# Patient Record
Sex: FEMALE | Race: BLACK OR AFRICAN AMERICAN | Employment: OTHER | URBAN - METROPOLITAN AREA
[De-identification: names, ages, dates, MRNs, and addresses within clinical notes are randomized per-mention and may not be internally consistent; named-entity substitution may affect disease eponyms.]

---

## 2018-01-03 DIAGNOSIS — O09.90 HIGH-RISK PREGNANCY, UNSPECIFIED TRIMESTER: Primary | ICD-10-CM

## 2018-01-08 ENCOUNTER — HOSPITAL ENCOUNTER (OUTPATIENT)
Dept: ULTRASOUND IMAGING | Facility: CLINIC | Age: 38
Discharge: HOME | End: 2018-01-08
Attending: MIDWIFE | Admitting: MIDWIFE
Payer: MEDICAID

## 2018-01-08 DIAGNOSIS — O09.90 HIGH-RISK PREGNANCY, UNSPECIFIED TRIMESTER: ICD-10-CM

## 2018-01-08 PROCEDURE — 76801 OB US < 14 WKS SINGLE FETUS: CPT

## 2018-01-08 PROCEDURE — T1013 SIGN LANG/ORAL INTERPRETER: HCPCS | Mod: U3

## 2018-02-12 DIAGNOSIS — Z36.89 ENCOUNTER FOR FETAL ANATOMIC SURVEY: ICD-10-CM

## 2018-02-12 DIAGNOSIS — Z13.71 SCREENING FOR GENETIC DISEASE CARRIER STATUS: Primary | ICD-10-CM

## 2018-03-15 ENCOUNTER — PRE VISIT (OUTPATIENT)
Dept: MATERNAL FETAL MEDICINE | Facility: CLINIC | Age: 38
End: 2018-03-15

## 2018-03-30 ENCOUNTER — HOSPITAL ENCOUNTER (OUTPATIENT)
Dept: ULTRASOUND IMAGING | Facility: CLINIC | Age: 38
Discharge: HOME | End: 2018-03-30
Attending: ADVANCED PRACTICE MIDWIFE | Admitting: OBSTETRICS & GYNECOLOGY
Payer: MEDICAID

## 2018-03-30 ENCOUNTER — OFFICE VISIT (OUTPATIENT)
Dept: MATERNAL FETAL MEDICINE | Facility: CLINIC | Age: 38
End: 2018-03-30
Attending: ADVANCED PRACTICE MIDWIFE
Payer: MEDICAID

## 2018-03-30 DIAGNOSIS — O09.522 SUPERVISION OF ELDERLY MULTIGRAVIDA IN SECOND TRIMESTER: Primary | ICD-10-CM

## 2018-03-30 DIAGNOSIS — O26.90 PREGNANCY RELATED CONDITION, ANTEPARTUM: ICD-10-CM

## 2018-03-30 DIAGNOSIS — O09.522 ELDERLY MULTIGRAVIDA IN SECOND TRIMESTER: Primary | ICD-10-CM

## 2018-03-30 PROCEDURE — 76811 OB US DETAILED SNGL FETUS: CPT

## 2018-03-30 PROCEDURE — 96040 ZZH GENETIC COUNSELING, EACH 30 MINUTES: CPT | Mod: ZF | Performed by: GENETIC COUNSELOR, MS

## 2018-03-30 NOTE — PROGRESS NOTES
[facility]  Genetic Counseling Consult    Patient:  Lucia Lora YOB: 1980   Date of Service:  3/30/18      Lucia Lora was seen at the [facility] for genetic consultation as part of her appointment for comprehensive ultrasound.  The indication for genetic counseling is advanced maternal age.       Impression/Plan:   1. Lucia has not had serum screening in this pregnancy.     2. Lucia had a comprehensive (level II) ultrasound today.  Please see the ultrasound report for further details.    3. The patient declines genetic amniocentesis and maternal serum screening today.    Pregnancy History:   /Parity:    Age at Delivery: 38 year old  ANDREINA: 2018, by Last Menstrual Period  Gestational Age: 19w2d    No significant complications or exposures were reported in the current pregnancy.    Lucia s pregnancy history is significant for three prior full term pregnancies with no reported complications.    Medical History:   Lucia s reported medical history is not expected to impact pregnancy management or risks to fetal development.       Family History:   A three-generation pedigree was obtained, and is scanned under the  Media  tab.   The following significant findings were reported by Lucia:      No significant health findings reported in any 1st, 2nd, or 3rd degree relative to the pregnancy    The reported family history is negative for multiple miscarriages, stillbirths, birth defects, mental retardation, known genetic conditions, and consanguinity.       Carrier Screening:       The patient has had previous carrier screening for hemoglobinopathies, the results of which were negative.  A copy of the report was available for our review today.       Risk Assessment for Chromosome Conditions:   We explained that the risk for fetal chromosome abnormalities increases with maternal age. We discussed  specific features of common chromosome abnormalities, including Down syndrome, trisomy 13, trisomy 18, and sex chromosome trisomies.      - At age 38 at midtrimester, the risk to have a baby with Down syndrome is 1 in 216.     - At age 38 at midtrimester, the risk to have a baby with any chromosome abnormality is 1 in 105.     - At age 38 at delivery, the risk to have a baby with Down syndrome is 1 in 294.     - At age 38 at delivery, the risk to have a baby with any chromosome abnormality is 1 in 163.       Lucia did not have maternal serum screening earlier in pregnancy.         Testing Options:   We discussed the following options:   Non-invasive Prenatal Testing (NIPT)    Maternal plasma cell-free DNA testing; first trimester ultrasound with nuchal translucency and nasal bone assessment is recommended, when appropriate    Screens for fetal trisomy 21, trisomy 13, trisomy 18, and sex chromosome aneuploidy    Cannot screen for open neural tube defects; maternal serum AFP after 15 weeks is recommended       Genetic Amniocentesis    Invasive procedure typically performed in the second trimester by which amniotic fluid is obtained for the purpose of chromosome analysis and/or other prenatal genetic analysis    Diagnostic results; >99% sensitivity for fetal chromosome abnormalities    AFAFP measurement tests for open neural tube defects       Comprehensive (Level II) ultrasound:    Detailed ultrasound performed between 18-22 weeks gestation    Screen for major birth defects and markers for aneuploidy    We reviewed the benefits and limitations of this testing.  Screening tests provide a risk assessment specific to the pregnancy for certain fetal chromosome abnormalities, but cannot definitively diagnose or exclude a fetal chromosome abnormality.  Follow-up genetic counseling and consideration of diagnostic testing is recommended with any abnormal screening result. Diagnostic tests carry inherent risks- including risk  of miscarriage- that require careful consideration.  These tests can detect fetal chromosome abnormalities with greater than 99% certainty.  Results can be compromised by maternal cell contamination or mosaicism, and are limited by the resolution of cytogenetic G-banding technology.  There is no screening nor diagnostic test that can detect all forms of birth defects or mental disability.    It was a pleasure to be involved with Lucia s care. Face-to-face time of the meeting was 25 minutes.      Harrison Hussein MS, [facility]  Licensed Genetic Counselor  Phone: [phone]  Pager: [phone]

## 2018-03-30 NOTE — MR AVS SNAPSHOT
"              After Visit Summary   3/30/2018    Lucia Lora    MRN: [id number]           Patient Information     Date Of Birth          1980        Visit Information        Provider Department      3/30/2018 8:45 AM Harrison Hussein GC [facility] Maternal Fetal Medicine [facility]        Today's Diagnoses     Supervision of elderly multigravida in second trimester    -  1    Pregnancy related condition, antepartum           Follow-ups after your visit        Who to contact     If you have questions or need follow up information about today's clinic visit or your schedule please contact [facility] MATERNAL FETAL MEDICINE [facility] directly at [phone].  Normal or non-critical lab and imaging results will be communicated to you by UXArmyhart, letter or phone within 4 business days after the clinic has received the results. If you do not hear from us within 7 days, please contact the clinic through UXArmyhart or phone. If you have a critical or abnormal lab result, we will notify you by phone as soon as possible.  Submit refill requests through BestBoy Keyboard or call your pharmacy and they will forward the refill request to us. Please allow 3 business days for your refill to be completed.          Additional Information About Your Visit        MyChart Information     BestBoy Keyboard lets you send messages to your doctor, view your test results, renew your prescriptions, schedule appointments and more. To sign up, go to [email or web address] . Click on \"Log in\" on the left side of the screen, which will take you to the Welcome page. Then click on \"Sign up Now\" on the right side of the page.     You will be asked to enter the access code listed below, as well as some personal information. Please follow the directions to create your username and password.     Your access code is: [email or web address]  Expires: 2018  2:31 PM     Your access code will  in 90 days. If you need help or a new code, please call your [place] " clinic or [phone].        Care EveryWhere ID     This is your Care EveryWhere ID. This could be used by other organizations to access your [place] medical records  [id number]        Your Vitals Were     Last Period                   11/15/2017            Blood Pressure from Last 3 Encounters:   No data found for BP    Weight from Last 3 Encounters:   No data found for Wt              We Performed the Following     [facility] Genetic Counseling        Primary Care Provider Office Phone # Fax #    Clinic [facility] [phone]       [address] [facility] [address]        Equal Access to Services     [facility] : Hadii aad ku hadasho Soomaali, waaxda luqadaha, qaybta kaalmada adeegyada, waxay nataliyain hayaan adeeg steffi fisher . So [facility] [phone].    ATENCIÓN: Si habla español, tiene a hu disposición servicios gratuitos de asistencia lingüística. [facility] [phone].    We comply with applicable federal civil rights laws and [place] laws. We do not discriminate on the basis of race, color, national origin, age, disability, sex, sexual orientation, or gender identity.            Thank you!     Thank you for choosing [facility]  for your care. Our goal is always to provide you with excellent care. Hearing back from our patients is one way we can continue to improve our services. Please take a few minutes to complete the written survey that you may receive in the mail after your visit with us. Thank you!             Your Updated Medication List - Protect others around you: Learn how to safely use, store and throw away your medicines at www.disposemymeds.org.      Notice  As of 3/30/2018  2:31 PM    You have not been prescribed any medications.

## 2018-03-30 NOTE — PROGRESS NOTES
"Please see \"Imaging\" tab under \"Chart Review\" for details of today's US.      Jo Ann Renae, DO  Maternal-Fetal Medicine        "

## 2018-03-30 NOTE — MR AVS SNAPSHOT
"              After Visit Summary   3/30/2018    Lucia Lora    MRN: [id number]           Patient Information     Date Of Birth          1980        Visit Information        Provider Department      3/30/2018 10:00 AM Jo Ann Renae, DO [facility] Maternal Fetal Medicine [facility]        Today's Diagnoses     Elderly multigravida in second trimester    -  1       Follow-ups after your visit        Who to contact     If you have questions or need follow up information about today's clinic visit or your schedule please contact [facility] MATERNAL FETAL MEDICINE [facility] directly at [phone].  Normal or non-critical lab and imaging results will be communicated to you by QuietStream Financialhart, letter or phone within 4 business days after the clinic has received the results. If you do not hear from us within 7 days, please contact the clinic through [email or web address] or phone. If you have a critical or abnormal lab result, we will notify you by phone as soon as possible.  Submit refill requests through Celery or call your pharmacy and they will forward the refill request to us. Please allow 3 business days for your refill to be completed.          Additional Information About Your Visit        MyChart Information     Celery lets you send messages to your doctor, view your test results, renew your prescriptions, schedule appointments and more. To sign up, go to [email or web address] . Click on \"Log in\" on the left side of the screen, which will take you to the Welcome page. Then click on \"Sign up Now\" on the right side of the page.     You will be asked to enter the access code listed below, as well as some personal information. Please follow the directions to create your username and password.     Your access code is: [email or web address]  Expires: 2018  2:31 PM     Your access code will  in 90 days. If you need help or a new code, please call your [place] clinic or [phone].        Care EveryWhere ID     This is your " Care EveryWhere ID. This could be used by other organizations to access your [place] medical records  [id number]        Your Vitals Were     Last Period                   11/15/2017            Blood Pressure from Last 3 Encounters:   No data found for BP    Weight from Last 3 Encounters:   No data found for Wt              Today, you had the following     No orders found for display       Primary Care Provider Office Phone # Fax #    Clinic [facility] [phone]       [address] [facility] [address]        Equal Access to Services     CARLENE BENÍTEZ : Hadii aad ku hadasho Soomaali, waaxda luqadaha, qaybta kaalmada adeegyada, waxay idiin hayaan adeeg kharash la'neli . So [facility] [phone].    ATENCIÓN: Si habla español, tiene a hu disposición servicios gratuitos de asistencia lingüística. [facility] [phone].    We comply with applicable federal civil rights laws and [place] laws. We do not discriminate on the basis of race, color, national origin, age, disability, sex, sexual orientation, or gender identity.            Thank you!     Thank you for choosing [facility]  for your care. Our goal is always to provide you with excellent care. Hearing back from our patients is one way we can continue to improve our services. Please take a few minutes to complete the written survey that you may receive in the mail after your visit with us. Thank you!             Your Updated Medication List - Protect others around you: Learn how to safely use, store and throw away your medicines at www.disposemymeds.org.      Notice  As of 3/30/2018  3:17 PM    You have not been prescribed any medications.

## 2018-05-23 DIAGNOSIS — O24.410 DIET CONTROLLED GESTATIONAL DIABETES MELLITUS (GDM) IN THIRD TRIMESTER: Primary | ICD-10-CM

## 2018-06-20 DIAGNOSIS — O24.419 GESTATIONAL DIABETES MELLITUS (GDM) IN THIRD TRIMESTER, GESTATIONAL DIABETES METHOD OF CONTROL UNSPECIFIED: ICD-10-CM

## 2018-06-20 DIAGNOSIS — O09.93 HIGH-RISK PREGNANCY IN THIRD TRIMESTER: Primary | ICD-10-CM

## 2018-06-28 DIAGNOSIS — O24.415 GESTATIONAL DIABETES MELLITUS (GDM) IN THIRD TRIMESTER CONTROLLED ON ORAL HYPOGLYCEMIC DRUG: Primary | ICD-10-CM

## 2018-07-02 ENCOUNTER — OFFICE VISIT (OUTPATIENT)
Dept: MATERNAL FETAL MEDICINE | Facility: CLINIC | Age: 38
End: 2018-07-02
Attending: ADVANCED PRACTICE MIDWIFE
Payer: COMMERCIAL

## 2018-07-02 ENCOUNTER — HOSPITAL ENCOUNTER (OUTPATIENT)
Dept: ULTRASOUND IMAGING | Facility: CLINIC | Age: 38
Discharge: HOME | End: 2018-07-02
Attending: ADVANCED PRACTICE MIDWIFE | Admitting: SOCIAL WORKER
Payer: COMMERCIAL

## 2018-07-02 ENCOUNTER — OFFICE VISIT (OUTPATIENT)
Dept: ENDOCRINOLOGY | Facility: CLINIC | Age: 38
End: 2018-07-02
Payer: COMMERCIAL

## 2018-07-02 VITALS
DIASTOLIC BLOOD PRESSURE: 74 MMHG | WEIGHT: 264.4 LBS | HEIGHT: 69 IN | SYSTOLIC BLOOD PRESSURE: 110 MMHG | HEART RATE: 109 BPM | BODY MASS INDEX: 39.16 KG/M2

## 2018-07-02 DIAGNOSIS — O26.90 PREGNANCY RELATED CONDITION, UNSPECIFIED TRIMESTER: ICD-10-CM

## 2018-07-02 DIAGNOSIS — E01.0 THYROMEGALY: ICD-10-CM

## 2018-07-02 DIAGNOSIS — O24.414 INSULIN CONTROLLED GESTATIONAL DIABETES MELLITUS (GDM) DURING PREGNANCY, ANTEPARTUM: Primary | ICD-10-CM

## 2018-07-02 PROBLEM — O24.419 GESTATIONAL DIABETES: Status: ACTIVE | Noted: 2018-07-02

## 2018-07-02 LAB — HBA1C MFR BLD: 6.1 % (ref 4.3–6)

## 2018-07-02 PROCEDURE — 76819 FETAL BIOPHYS PROFIL W/O NST: CPT

## 2018-07-02 NOTE — MR AVS SNAPSHOT
After Visit Summary   7/2/2018    Lucia Lora    MRN: [id number]           Patient Information     Date Of Birth          1980        Visit Information        Provider Department      7/2/2018 11:45 AM Sharri Langford MD; MONICA GAYTAN [facility] Endocrinology        Today's Diagnoses     Gestational diabetes    -  1    Thyromegaly          Care Instructions    Target BG during pregnancy:   Fasting <95   1 hr postmeal <140   2 hrs postmeal <120   HbA1c < 6%       Start checking the BG before breakfast, 1 hr after the meal meals of the day and at bedtime   Contact us with the BG numbers after starting insulin for 5 days or have the meter downloaded in the clinic   Correct the hypoglycemic episodes (BG<70) by having 3-4 glucose tablets or 4-8 oz of sweetened juice of preference.             Follow-ups after your visit        Follow-up notes from your care team     Return in about 4 weeks (around 7/30/2018) for labs in 1 week.      Your next 10 appointments already scheduled     Jul 02, 2018  3:00 PM CDT   MFDAVID BPP SINGLE with [id number]   [facility] - [facility])    [address]  [facility] [address] [phone]            Jul 02, 2018  3:30 PM CDT   Radiology MD with UR IMAN MONTILLA   [facility] - [facility])    [address]  [facility] [address]   [phone]           Please arrive at the time given for your first appointment. This visit is used internally to schedule the physician's time during your ultrasound.            Jul 05, 2018 11:00 AM CDT   [facility] US COMPRE SINGLE F/U with [id number]   [facility] Ultrasound - [facility])    [address]  [facility] [id number] [phone]           Wear comfortable clothes and leave  your valuables at home.            Jul 05, 2018 11:30 AM CDT   Radiology MD with JULIANA VALERIO MD   [facility] [place] ([facility])    [address]  [facility] [address]   [phone]           Please arrive at the time given for your first appointment. This visit is used internally to schedule the physician's time during your ultrasound.            Jul 05, 2018 12:15 PM CDT   Diabetic Education with Nu Corona RD   [facility] [place],  Diabetes Education ([facility])    [facility] [address]  [address] [place] [id number] [phone]            Jul 30, 2018 11:00 AM CDT   (Arrive by 10:45 AM)   RETURN DIABETES with Sharri Langford MD   [facility] Endocrinology ([facility])    [address] [facility]  3rd Floor  [facility] [place] [id number] [phone]              Future tests that were ordered for you today     Open Future Orders        Priority Expected Expires Ordered    TSH with free T4 reflex Routine 7/2/2018 7/2/2019 7/2/2018            Who to contact     Please call your clinic at [phone] to:    Ask questions about your health    Make or cancel appointments    Discuss your medicines    Learn about your test results    Speak to your doctor            Additional Information About Your Visit        MyChart Information     Flared3D is an electronic gateway that provides easy, online access to your medical records. With Flared3D, you can request a clinic appointment, read your test results, renew a prescription or communicate with your care team.     To sign up for Flared3D visit the website at [email or web address]   You will be asked to enter the access code listed below, as well as some personal information. Please follow the directions to create your username and password.     Your access code is: [email or web address]  Expires: 9/27/2018  6:31 AM     Your access  "code will  in 90 days. If you need help or a new code, please contact your [facility] or call [phone] for assistance.        Care EveryWhere ID     This is your Care EveryWhere ID. This could be used by other organizations to access your [place] medical records  [id number]        Your Vitals Were     Pulse Height Last Period BMI (Body Mass Index)          109 1.753 m (5' 9\") 11/15/2017 39.05 kg/m2         Blood Pressure from Last 3 Encounters:   18 110/74    Weight from Last 3 Encounters:   18 119.9 kg (264 lb 6.4 oz)              We Performed the Following     Hemoglobin A1c POCT          Today's Medication Changes          These changes are accurate as of 18 12:54 PM.  If you have any questions, ask your nurse or doctor.               Start taking these medicines.        Dose/Directions    insulin  UNIT/ML injection   Commonly known as:  HumuLIN N/NovoLIN N   Used for:  Gestational diabetes   Started by:  Sharri Langford MD        Dose:  7 Units   Inject 7 Units Subcutaneous At Bedtime   Quantity:  9 mL   Refills:  3            Where to get your medicines      These medications were sent to [facility] [address] [place] [address]     Phone:  [phone]     insulin  UNIT/ML injection                Primary Care Provider Office Phone # Fax #    [facility] [phone]        [facility] [address]        Equal Access to Services     CARLENE BENÍTEZ AH: Hadii jayde petersono Sobenitoali, waaxda luqadaha, qaybta kaalmada adeegyada, waxay maren to ademegan wilks. So [facility] [phone].    ATENCIÓN: Si habla español, tiene a hu disposición servicios gratuitos de asistencia lingüística. Llame al [phone].    We comply with applicable federal civil rights laws and [place] laws. We do not discriminate on the basis of race, color, " national origin, age, disability, sex, sexual orientation, or gender identity.            Thank you!     Thank you for choosing [facility] ENDOCRINOLOGY  for your care. Our goal is always to provide you with excellent care. Hearing back from our patients is one way we can continue to improve our services. Please take a few minutes to complete the written survey that you may receive in the mail after your visit with us. Thank you!             Your Updated Medication List - Protect others around you: Learn how to safely use, store and throw away your medicines at www.disposemymeds.org.          This list is accurate as of 7/2/18 12:54 PM.  Always use your most recent med list.                   Brand Name Dispense Instructions for use Diagnosis    insulin  UNIT/ML injection    HumuLIN N/NovoLIN N    9 mL    Inject 7 Units Subcutaneous At Bedtime    Gestational diabetes

## 2018-07-02 NOTE — PATIENT INSTRUCTIONS
Target BG during pregnancy:   Fasting <95   1 hr postmeal <140   2 hrs postmeal <120   HbA1c < 6%       Start checking the BG before breakfast, 1 hr after the meal meals of the day and at bedtime   Contact us with the BG numbers after starting insulin for 5 days or have the meter downloaded in the clinic   Correct the hypoglycemic episodes (BG<70) by having 3-4 glucose tablets or 4-8 oz of sweetened juice of preference.

## 2018-07-02 NOTE — PROGRESS NOTES
Please see ultrasound report under imaging tab for details on ultrasound performed today.    Lucretia Haas MD  , OB/GYN  Maternal-Fetal Medicine  [email or web address]  [phone] (Academic office)  [phone] (Pager)

## 2018-07-02 NOTE — LETTER
7/2/2018       RE: Lucia Lora  [address]  [facility] [address]     Dear Colleague,    Thank you for referring your patient, Lucia Lora, to the [facility] ENDOCRINOLOGY at [facility]. Please see a copy of my visit note below.      The patient is seen in consultation at the request of Dr. Ashley Fowler for evaluation of gestational diabetes.    Lucia Lora is a 38 year old Turkmen female, with a nonsignificant past medical history, diagnosed with gestational diabetes approximately 1 month ago.  She is now 32 weeks pregnant.  This is her fourth pregnancy.  She immigrated from [facility], 8 years ago.  Denies being diagnosed with diabetes or gestational diabetes prior to this pregnancy.  Her third child was born in the [place], with a birth weight of 8 or 9 pounds.  She does not remember the birth weight of her other children.    Hemoglobin A1c today was 6.1%.  Review of prior records reveal an A1c of 5.9, back in 2015.  The patient was not pregnant at that time.    She has not seen a dietitian or a diabetes educator yet, but she has an appointment to see the diabetes educator on July 5th.  We briefly reviewed with the carbohydrate content of her main meals.  In a regular day, she has 2 meals, breakfast and lunch.  Breakfast is around 10-11 AM, lunch around 3 PM.  For breakfast, she has Ingera or regular bread as the main source of carbohydrates.  For lunch, she frequently has rice or pasta.  In the evening, she prefers to snack, frequently on fruits (likes apples and grapes).  She rarely has steroids, approximately once a week.  She reports having fish or chicken, on a regular basis.      Her weight prior to this pregnancy used to be 215 pounds.  She has gained 50 pounds with this pregnancy.   The glucometer readings revealed that she checks her blood sugar twice daily, before breakfast and/or 2 hours after eating.  Morning blood sugar is  consistently above 100, around 105-110.  The postprandial blood glucose is variable, but most of the numbers (90%) are  below target of 140.  Overall, the average blood glucose on the meter is 115, with a standard deviation of 24 and a range variable between 72 and 175.    Review of outside lab work reveal a TSH of 3.51 in 2015.  The patient denies a personal or family history of thyroid disorders.  She currently feels good and she does not endorse signs or symptoms suggestive of hypo-or hyperthyroidism.    Past Medical History   Gestational diabetes     Past Surgical Hystory   No past surgical history on file.    Current Medications   She is medicated with 5000 units vitamin D daily  Prescription Medications as of 7/2/2018             insulin NPH (HUMULIN N/NOVOLIN N) 100 UNIT/ML injection Inject 7 Units Subcutaneous At Bedtime          Family History   No significantly family history.     Social History   with 3 children. She denies smoking, drinking alcohol or using illicit drugs. Occupation: care provider.     Review of Systems   Systemic:              Occasional fatigue - mainly after eating  Eye:                       No eye symptoms, no blurred vision  Sedrick-Laryngeal:      No sedrick-laryngeal symptoms, no dysphagia, no voice hoarseness, no cough      Breast:                   No breast symptoms  Cardiovascular:     No cardiovascular symptoms, no CP or palpitations   Pulmonary:            No pulmonary symptoms, no cough or SOB    Gastrointestinal:    No gastrointestinal symptoms, no diarrhea or constipation   Genitourinary:        Increased thirst, urinates 2 times a night    Endocrine:             No endocrine symptoms, no heat or cold intolerance   Neurological:          No neurological symptoms, no headaches, no tremor, no dizziness     Musculoskeletal:    No musculoskeletal symptoms, no muscle or joint pain   Skin:                       No skin symptoms   Psychological:       No psychological symptoms    "             Vital Signs     Previous Weights:    Wt Readings from Last 10 Encounters:   18 119.9 kg (264 lb 6.4 oz)                   /74 (BP Location: Right arm, Patient Position: Sitting, Cuff Size: Adult Regular)  Pulse 109  Ht 1.753 m (5' 9\")  Wt 119.9 kg (264 lb 6.4 oz)  LMP 11/15/2017  BMI 39.05 kg/m2    Physical Exam  General Appearance:  she is well developed, pregnant/overweight   Eyes:  conjutivae and extra-ocular motions are normal.                                    pupils round and reactive to light, no lid lag, no stare    HEENT:   oropharynx clear and moist, no JVD, no bruits     Mild thyromegaly, symmetrical, no palpable nodules   Cardiovascular:  regular rhythm, no murmurs, distal pulse palpable, no edema  Respiratory:       chest clear, no rales, no rhonchi   Gastrointestinal: abdomen pregnant, normal bowel sounds, stretch marks   musculoskeletal: normal tone and strength  Psychiatric: affect and judgment normal  Skin: Acanthosis nigricans of the flexural areas  Neurological: reflexes normal and symmetric, no resting tremor     Lab Results  I reviewed prior lab results documented in Epic.  No results found for: A1C, HEMOGLOBINA1    Hemoglobin   Date Value Ref Range Status   2011 10.3 (L) 11.7 - 15.7 g/dL Final     Hematocrit   Date Value Ref Range Status   02/10/2011 34.4 (L) 35.0 - 47.0 % Final     Assessment     1.  Gestational diabetes, diagnosed in the third trimester in a 38-year-old female, now 32 weeks pregnant.  It is possible that she actually had prediabetes prior to this pregnancy, as her A1c was slightly elevated back in .  The A1c is borderline high, as well as the fasting blood sugar numbers on the meter.    The patient was counseled on:   - the importance of obtaining a tight glycemic control during pregnancy, in order to decrease the risk of potential fetal/pregnancy complications like: macrosomia, preeclampsia,  birth, etc;   - blood glucose goals " in diabetic pregnant woman: premeal BG < 95, 1 hour postprandial BG less than 120, 2 hours postprandial BG less than 140, HbA1c <6%.  - the higher risk of developing type 2 diabetes in the future and recurrent gestational diabetes with her future pregnancies  - treatment of GD:  *diet: identified the main food sources of carbohydrates in her diet: Wheat, potatoes and rice. Discussed about the importance of portion size control, incorporating proteins as meat and dairies in her diet, as well as vegetables and fruits.  *Checking blood sugar fasting, 1 hour after the main meals and snacks of the day and at bedtime  *NPH insulin.  In view of the elevated morning blood sugar, I recommended to start treatment with 7 units insulin at bedtime.  The patient was counseled on insulin administration and the risk of hypoglycemia, hypoglycemic symptoms and correction of hypoglycemia by having 15 g of easily absorbable carbohydrates.  Since she has an appointment scheduled with the diabetes educator in 3 days, I recommended to postpone starting insulin until she meets the diabetes educator.  In the event the blood sugar numbers change until then, we might need to adjust the dose of NPH.    2.  Mild thyromegaly  Most likely normal, in the context of pregnancy.  However, given the high normal TSH of 3.51 on prior lab work, I recommended to have a reflex TSH done today and the patient agreed.  Clinically, she does not endorse signs or symptoms suggestive of hypothyroidism.     Orders Placed This Encounter   Procedures     TSH with free T4 reflex     Hemoglobin A1c POCT           Again, thank you for allowing me to participate in the care of your patient.      Sincerely,    Sharri Langford MD

## 2018-07-02 NOTE — PROGRESS NOTES
The patient is seen in consultation at the request of Dr. Ashley Fowler for evaluation of gestational diabetes.    Lucia Lora is a 38 year old Congolese female, with a nonsignificant past medical history, diagnosed with gestational diabetes approximately 1 month ago.  She is now 32 weeks pregnant.  This is her fourth pregnancy.  She immigrated from [facility], 8 years ago.  Denies being diagnosed with diabetes or gestational diabetes prior to this pregnancy.  Her third child was born in the [place], with a birth weight of 8 or 9 pounds.  She does not remember the birth weight of her other children.    Hemoglobin A1c today was 6.1%.  Review of prior records reveal an A1c of 5.9, back in 2015.  The patient was not pregnant at that time.    She has not seen a dietitian or a diabetes educator yet, but she has an appointment to see the diabetes educator on July 5th.  We briefly reviewed with the carbohydrate content of her main meals.  In a regular day, she has 2 meals, breakfast and lunch.  Breakfast is around 10-11 AM, lunch around 3 PM.  For breakfast, she has Ingera or regular bread as the main source of carbohydrates.  For lunch, she frequently has rice or pasta.  In the evening, she prefers to snack, frequently on fruits (likes apples and grapes).  She rarely has steroids, approximately once a week.  She reports having fish or chicken, on a regular basis.      Her weight prior to this pregnancy used to be 215 pounds.  She has gained 50 pounds with this pregnancy.   The glucometer readings revealed that she checks her blood sugar twice daily, before breakfast and/or 2 hours after eating.  Morning blood sugar is consistently above 100, around 105-110.  The postprandial blood glucose is variable, but most of the numbers (90%) are  below target of 140.  Overall, the average blood glucose on the meter is 115, with a standard deviation of 24 and a range variable between 72 and 175.    Review of outside lab work  "reveal a TSH of 3.51 in 2015.  The patient denies a personal or family history of thyroid disorders.  She currently feels good and she does not endorse signs or symptoms suggestive of hypo-or hyperthyroidism.    Past Medical History   Gestational diabetes     Past Surgical Hystory   No past surgical history on file.    Current Medications   She is medicated with 5000 units vitamin D daily  Prescription Medications as of 7/2/2018             insulin NPH (HUMULIN N/NOVOLIN N) 100 UNIT/ML injection Inject 7 Units Subcutaneous At Bedtime          Family History   No significantly family history.     Social History   with 3 children. She denies smoking, drinking alcohol or using illicit drugs. Occupation: care provider.     Review of Systems   Systemic:              Occasional fatigue - mainly after eating  Eye:                       No eye symptoms, no blurred vision  Sedrick-Laryngeal:      No sedrick-laryngeal symptoms, no dysphagia, no voice hoarseness, no cough      Breast:                   No breast symptoms  Cardiovascular:     No cardiovascular symptoms, no CP or palpitations   Pulmonary:            No pulmonary symptoms, no cough or SOB    Gastrointestinal:    No gastrointestinal symptoms, no diarrhea or constipation   Genitourinary:        Increased thirst, urinates 2 times a night    Endocrine:             No endocrine symptoms, no heat or cold intolerance   Neurological:          No neurological symptoms, no headaches, no tremor, no dizziness     Musculoskeletal:    No musculoskeletal symptoms, no muscle or joint pain   Skin:                       No skin symptoms   Psychological:       No psychological symptoms                Vital Signs     Previous Weights:    Wt Readings from Last 10 Encounters:   07/02/18 119.9 kg (264 lb 6.4 oz)                   /74 (BP Location: Right arm, Patient Position: Sitting, Cuff Size: Adult Regular)  Pulse 109  Ht 1.753 m (5' 9\")  Wt 119.9 kg (264 lb 6.4 oz)  LMP " 11/15/2017  BMI 39.05 kg/m2    Physical Exam  General Appearance:  she is well developed, pregnant/overweight   Eyes:  conjutivae and extra-ocular motions are normal.                                    pupils round and reactive to light, no lid lag, no stare    HEENT:   oropharynx clear and moist, no JVD, no bruits     Mild thyromegaly, symmetrical, no palpable nodules   Cardiovascular:  regular rhythm, no murmurs, distal pulse palpable, no edema  Respiratory:       chest clear, no rales, no rhonchi   Gastrointestinal: abdomen pregnant, normal bowel sounds, stretch marks   musculoskeletal: normal tone and strength  Psychiatric: affect and judgment normal  Skin: Acanthosis nigricans of the flexural areas  Neurological: reflexes normal and symmetric, no resting tremor     Lab Results  I reviewed prior lab results documented in Epic.  No results found for: A1C, HEMOGLOBINA1    Hemoglobin   Date Value Ref Range Status   2011 10.3 (L) 11.7 - 15.7 g/dL Final     Hematocrit   Date Value Ref Range Status   02/10/2011 34.4 (L) 35.0 - 47.0 % Final     Assessment     1.  Gestational diabetes, diagnosed in the third trimester in a 38-year-old female, now 32 weeks pregnant.  It is possible that she actually had prediabetes prior to this pregnancy, as her A1c was slightly elevated back in .  The A1c is borderline high, as well as the fasting blood sugar numbers on the meter.    The patient was counseled on:   - the importance of obtaining a tight glycemic control during pregnancy, in order to decrease the risk of potential fetal/pregnancy complications like: macrosomia, preeclampsia,  birth, etc;   - blood glucose goals in diabetic pregnant woman: premeal BG < 95, 1 hour postprandial BG less than 120, 2 hours postprandial BG less than 140, HbA1c <6%.  - the higher risk of developing type 2 diabetes in the future and recurrent gestational diabetes with her future pregnancies  - treatment of GD:  *diet: identified  the main food sources of carbohydrates in her diet: Wheat, potatoes and rice. Discussed about the importance of portion size control, incorporating proteins as meat and dairies in her diet, as well as vegetables and fruits.  *Checking blood sugar fasting, 1 hour after the main meals and snacks of the day and at bedtime  *NPH insulin.  In view of the elevated morning blood sugar, I recommended to start treatment with 7 units insulin at bedtime.  The patient was counseled on insulin administration and the risk of hypoglycemia, hypoglycemic symptoms and correction of hypoglycemia by having 15 g of easily absorbable carbohydrates.  Since she has an appointment scheduled with the diabetes educator in 3 days, I recommended to postpone starting insulin until she meets the diabetes educator.  In the event the blood sugar numbers change until then, we might need to adjust the dose of NPH.    2.  Mild thyromegaly  Most likely normal, in the context of pregnancy.  However, given the high normal TSH of 3.51 on prior lab work, I recommended to have a reflex TSH done today and the patient agreed.  Clinically, she does not endorse signs or symptoms suggestive of hypothyroidism.     Orders Placed This Encounter   Procedures     TSH with free T4 reflex     Hemoglobin A1c POCT

## 2018-07-02 NOTE — NURSING NOTE
Chief Complaint   Patient presents with     Consult     GDM     Performed capillary puncture for A1C testing. Patient tolerated well.    Ani Encinas [facility]  Endocrinology & Diabetes

## 2018-07-02 NOTE — MR AVS SNAPSHOT
After Visit Summary   7/2/2018    Lucia Lora    MRN: [id number]           Patient Information     Date Of Birth          1980        Visit Information        Provider Department      7/2/2018 3:30 PM Lucretia Haas MD [facility] Maternal Fetal Medicine - [place]        Today's Diagnoses     Insulin controlled gestational diabetes mellitus (GDM) during pregnancy, antepartum    -  1       Follow-ups after your visit        Your next 10 appointments already scheduled     Jul 05, 2018 11:00 AM CDT   MFM US COMPRE SINGLE F/U with [id number]   [facility] M[facility]etal Medicine Ultrasound - [facility])    [address]  [facility] [id number] [phone]           Wear comfortable clothes and leave your valuables at home.            Jul 05, 2018 11:30 AM CDT   Radiology MD with UR IMAN MONTILLA   [facility] Maternal Fetal Medicine - [facility])    [address]  [facility] [address]   [phone]           Please arrive at the time given for your first appointment. This visit is used internally to schedule the physician's time during your ultrasound.            Jul 05, 2018 12:15 PM CDT   Diabetic Education with Nu Corona RD   [facility] [place],  Diabetes Education ([facility])    [address] [place] [id number] [phone]            Jul 30, 2018 11:00 AM CDT   (Arrive by 10:45 AM)   RETURN DIABETES with Sharri Langford MD   [facility] Endocrinology ([facility] [place])    [address] [address] [phone]              Future tests that were ordered for you today     Open Future Orders        Priority Expected Expires Ordered    TSH with free T4 reflex Routine 7/2/2018 7/2/2019 7/2/2018            Who to contact     If you have questions or need  "follow up information about today's clinic visit or your schedule please contact [facility] MATERNAL FETAL MEDICINE [facility] directly at [phone].  Normal or non-critical lab and imaging results will be communicated to you by MyChart, letter or phone within 4 business days after the clinic has received the results. If you do not hear from us within 7 days, please contact the clinic through [facility] or phone. If you have a critical or abnormal lab result, we will notify you by phone as soon as possible.  Submit refill requests through [employer] or call your pharmacy and they will forward the refill request to us. Please allow 3 business days for your refill to be completed.          Additional Information About Your Visit        [facility] [employer] Information     [employer] lets you send messages to your doctor, view your test results, renew your prescriptions, schedule appointments and more. To sign up, go to [email or web address] . Click on \"Log in\" on the left side of the screen, which will take you to the Welcome page. Then click on \"Sign up Now\" on the right side of the page.     You will be asked to enter the access code listed below, as well as some personal information. Please follow the directions to create your username and password.     Your access code is: [email or web address]  Expires: 2018  6:31 AM     Your access code will  in 90 days. If you need help or a new code, please call your [place] clinic or [phone].        Care EveryWhere ID     This is your Care EveryWhere ID. This could be used by other organizations to access your [place] medical records  [id number]        Your Vitals Were     Last Period                   11/15/2017            Blood Pressure from Last 3 Encounters:   18 110/74    Weight from Last 3 Encounters:   18 119.9 kg (264 lb 6.4 oz)              Today, you had the following     No orders found for display         Today's Medication Changes          These changes are " accurate as of 7/2/18  4:36 PM.  If you have any questions, ask your nurse or doctor.               Start taking these medicines.        Dose/Directions    insulin  UNIT/ML injection   Commonly known as:  HumuLIN N/NovoLIN N   Started by:  Sharri Langford MD        Dose:  7 Units   Inject 7 Units Subcutaneous At Bedtime   Quantity:  9 mL   Refills:  3            Where to get your medicines      These medications were sent to [place] [address] [place] [facility] [address] [facility]  [address] [facility] [address]     Phone:  [phone]     insulin  UNIT/ML injection                Primary Care Provider Office Phone # Fax #    Clinic [facility] [phone]       [address] [facility] [address]        Equal Access to Services     CARLENE BENÍTEZ : Hadii aad ku hadasho Soomaali, waaxda luqadaha, qaybta kaalmada adeegyada, waxay maren fisher . So [facility] [phone].    ATENCIÓN: Si habla español, tiene a hu disposición servicios gratuitos de asistencia lingüística. [facility] [phone].    We comply with applicable federal civil rights laws and [place] laws. We do not discriminate on the basis of race, color, national origin, age, disability, sex, sexual orientation, or gender identity.            Thank you!     Thank you for choosing [facility]  for your care. Our goal is always to provide you with excellent care. Hearing back from our patients is one way we can continue to improve our services. Please take a few minutes to complete the written survey that you may receive in the mail after your visit with us. Thank you!             Your Updated Medication List - Protect others around you: Learn how to safely use, store and throw away your medicines at www.disposemymeds.org.          This list is accurate as of 7/2/18  4:36 PM.  Always use your most recent med list.                   Brand Name Dispense  Instructions for use Diagnosis    insulin  UNIT/ML injection    HumuLIN N/NovoLIN N    9 mL    Inject 7 Units Subcutaneous At Bedtime

## 2018-07-05 ENCOUNTER — OFFICE VISIT (OUTPATIENT)
Dept: MATERNAL FETAL MEDICINE | Facility: CLINIC | Age: 38
End: 2018-07-05
Attending: MIDWIFE
Payer: COMMERCIAL

## 2018-07-05 ENCOUNTER — ALLIED HEALTH/NURSE VISIT (OUTPATIENT)
Dept: EDUCATION SERVICES | Facility: CLINIC | Age: 38
End: 2018-07-05
Attending: NUTRITIONIST
Payer: COMMERCIAL

## 2018-07-05 ENCOUNTER — HOSPITAL ENCOUNTER (OUTPATIENT)
Dept: ULTRASOUND IMAGING | Facility: CLINIC | Age: 38
Discharge: HOME | End: 2018-07-05
Attending: MIDWIFE | Admitting: OBSTETRICS & GYNECOLOGY
Payer: COMMERCIAL

## 2018-07-05 DIAGNOSIS — O26.90 PREGNANCY RELATED CONDITION, ANTEPARTUM: ICD-10-CM

## 2018-07-05 DIAGNOSIS — O24.414 INSULIN CONTROLLED GESTATIONAL DIABETES MELLITUS (GDM) DURING PREGNANCY, ANTEPARTUM: Primary | ICD-10-CM

## 2018-07-05 PROBLEM — O36.60X0 LGA (LARGE FOR GESTATIONAL AGE) FETUS AFFECTING MANAGEMENT OF MOTHER: Status: ACTIVE | Noted: 2018-07-05

## 2018-07-05 PROCEDURE — 76816 OB US FOLLOW-UP PER FETUS: CPT

## 2018-07-05 PROCEDURE — G0108 DIAB MANAGE TRN  PER INDIV: HCPCS | Performed by: NUTRITIONIST

## 2018-07-05 PROCEDURE — 76819 FETAL BIOPHYS PROFIL W/O NST: CPT | Performed by: OBSTETRICS & GYNECOLOGY

## 2018-07-05 NOTE — MR AVS SNAPSHOT
"              After Visit Summary   7/5/2018    Lucia Lora    MRN: [id number]           Patient Information     Date Of Birth          1980        Visit Information        Provider Department      7/5/2018 12:15 PM Nu Corona RD; [employer] [facility], [place],  Diabetes Education        Care Instructions    You need 2-3 carbohydrates in the morning.   2-3 injera or malawah or one cup oatmeal. Have this with some protein such as  meat, egg, nuts    You need 3-4 carbohydrates at lunch and dinner.   This means keeping rice and pasta portions to no more than about one cup.    Have 1-2 carbohydrate for a snack.    Fruit is a good option for a snack    See the booklet I gave you today.     Good job with cutting out sweet drinks. Try to eliminate the sugar in your tea.    Taking Insulin:    1. Take NPH (Humulin-N or Novolin-N) - 7 units at bedtime as prescribed by your Endocrinologist.     - Do a 2 unit \"prime\" before each injection, be sure a stream of insulin comes out of the needle before you give your injection.    - After you inject, hold the needle under the skin to the count of 10 to be sure all of the insulin goes in.     - Rotate injection sites, keeping at least 1 inch apart from last injection site and 2 inches away from belly button or surgical scars.    2. Pen - Use a new pen needle for each injection. Always remove pen needle from the insulin pen after use and do not store insulin pens with the needle on the pen.     3. Store insulin you are not using in the refrigerator (do not freeze). Take new insulin out of the refrigerator a few hours prior to use to bring to room temperature.     4. Once opened NPH Pens (Humulin N or Novolin N) can be kept at room temperature for 14 days after opened.. Do not use the opened insulin after this time has passed, even if there is still medicine inside.     5. Always carry your blood sugar meter and a sugar source like glucose tablets with you " in case of a low blood sugar. Treat a low blood sugar (less than 70) with 15 grams of carbohydrate (1 carb choice). Wait 15 minutes, recheck blood sugar. If blood sugar is still below 70, repeat the treatment.    6. Wear Medical ID or carry a wallet card stating you have Diabetes.    7. Call your doctor or diabetes educator if you begin having low blood sugars or if you have questions or concerns.     8. Complete and mail in the form to inform the [place] Department of Public Safety that you have started taking insulin.    9. Follow-up: Follow-up diabetes education appointment scheduled on July 19th at 12:30pm.            Follow-ups after your visit        Your next 10 appointments already scheduled     Jul 09, 2018  3:30 PM CDT   IMAN CADENA SINGLE with [email or web address]   [facility] Maternal Fetal Medicine Ultrasound - [facility])    [address]  [facility] [address]   [phone]            Jul 09, 2018  4:00 PM CDT   Radiology MD with JULIANA VALERIO MD   [facility] Maternal Fetal Medicine - [facility])    [address]  [facility] [address]   [phone]           Please arrive at the time given for your first appointment. This visit is used internally to schedule the physician's time during your ultrasound.            Jul 12, 2018  3:00 PM CDT   IMAN CADENA SINGLE with URMFMUSR1   [facility] Maternal Fetal Medicine Ultrasound - [place] ([facility])    [address]  [facility] [address]   [phone]            Jul 12, 2018  3:30 PM CDT   Radiology MD with JULIANA VALERIO MD   [facility] - [place] ([facility])    [address]  [facility] [address]   [phone]           Please arrive at the time given for your first appointment. This visit is used internally to schedule the physician's time during your ultrasound.             Jul 16, 2018  3:00 PM CDT   MFM BPP SINGLE with [email or web address]   [facility] - [place] ([facility])    [address]  [facility] [address] [phone]            Jul 16, 2018  3:30 PM CDT   Radiology MD with JULIANA VALERIO MD   [facility] [place] ([facility])    [address]  [facility] [address]   [phone]           Please arrive at the time given for your first appointment. This visit is used internally to schedule the physician's time during your ultrasound.            Jul 19, 2018 11:45 AM CDT   MFM BPP SINGLE with URMFMUSR3   [facility] [place] ([facility])    [address]  [facility] [address] [phone]            Jul 19, 2018 12:15 PM CDT   Radiology MD with JULIANA VALERIO MD   [facility] [place] ([facility])    [address]  [facility] [address]   [phone]           Please arrive at the time given for your first appointment. This visit is used internally to schedule the physician's time during your ultrasound.            Jul 19, 2018 12:30 PM CDT   Office Visit with Nu Corona RD   [facility], [place],  Diabetes Education ([facility])    [address] [place] [address]  [address]  [facility] [id number] [phone]           Bring a current list of meds and any records pertaining to this visit. For Physicals, please bring immunization records and any forms needing to be filled out. Please arrive 10 minutes early to complete paperwork.            Jul 23, 2018  3:00 PM CDT   MFM BPP SINGLE with URMFMUSR2   [facility] [place] ([facility])    [address]  [facility] [address]  "  [phone]              Future tests that were ordered for you today     Open Future Orders        Priority Expected Expires Ordered    MFM BPP Single Routine  5/5/2019 7/5/2018    MFM US Comprehensive Single F/U Routine  7/5/2019 7/5/2018    Maternal Fetal BPP Single Routine 7/12/2018 7/5/2019 7/5/2018    Maternal Fetal BPP Single Routine  7/5/2019 7/5/2018    Maternal Fetal BPP Single Routine  7/5/2019 7/5/2018    Maternal Fetal BPP Single Routine  7/5/2019 7/5/2018    Maternal Fetal BPP Single Routine  7/5/2019 7/5/2018    Maternal Fetal BPP Single Routine  7/5/2019 7/5/2018    Maternal Fetal BPP Single Routine  7/5/2019 7/5/2018    Maternal Fetal US Comprehensive Single F/U Routine 8/2/2018 7/5/2019 7/5/2018            Who to contact     If you have questions or need follow up information about today's clinic visit or your schedule please contact [facility]  [facility] directly at [phone].  Normal or non-critical lab and imaging results will be communicated to you by [employer], letter or phone within 4 business days after the clinic has received the results. If you do not hear from us within 7 days, please contact the clinic through Kaiamt or phone. If you have a critical or abnormal lab result, we will notify you by phone as soon as possible.  Submit refill requests through [employer]ent or call your pharmacy and they will forward the refill request to us. Please allow 3 business days for your refill to be completed.          Additional Information About Your Visit        Transfluent Information     Trans[employer]ent lets you send messages to your doctor, view your test results, renew your prescriptions, schedule appointments and more. To sign up, go to [email or web address] . Click on \"Log in\" on the left side of the screen, which will take you to the Welcome page. Then click on \"Sign up Now\" on the right side of the page.     You will be asked to enter the access code listed below, as well as some " personal information. Please follow the directions to create your username and password.     Your access code is: [email or web address]  Expires: 2018  6:31 AM     Your access code will  in 90 days. If you need help or a new code, please call your [place] clinic or [phone].        Care EveryWhere ID     This is your Care EveryWhere ID. This could be used by other organizations to access your [place] medical records  [id number]        Your Vitals Were     Last Period                   11/15/2017            Blood Pressure from Last 3 Encounters:   18 110/74    Weight from Last 3 Encounters:   18 119.9 kg (264 lb 6.4 oz)              Today, you had the following     No orders found for display       Primary Care Provider Office Phone # Fax #    Clinic [facility] [phone]        [facility] [address]        Equal Access to Services     CARLENE BENÍTEZ : Hadii aad ku hadasho Soomaali, waaxda luqadaha, qaybta kaalmada adeegyada, waxay idiin hayaan ademegan fisher . So [facility] [phone].    ATENCIÓN: Si habla español, tiene a hu disposición servicios gratuitos de asistencia lingüística. Llame al [phone].    We comply with applicable federal civil rights laws and [place] laws. We do not discriminate on the basis of race, color, national origin, age, disability, sex, sexual orientation, or gender identity.            Thank you!     Thank you for choosing [facility]  DIABETES EDUCATION  for your care. Our goal is always to provide you with excellent care. Hearing back from our patients is one way we can continue to improve our services. Please take a few minutes to complete the written survey that you may receive in the mail after your visit with us. Thank you!             Your Updated Medication List - Protect others around you: Learn how to safely use, store and throw away your medicines at www.disposemymeds.org.          This list is accurate as of  7/5/18  1:40 PM.  Always use your most recent med list.                   Brand Name Dispense Instructions for use Diagnosis    insulin  UNIT/ML injection    HumuLIN N/NovoLIN N    9 mL    Inject 7 Units Subcutaneous At Bedtime

## 2018-07-05 NOTE — PATIENT INSTRUCTIONS
"You need 2-3 carbohydrates in the morning.   2-3 injera or malawah or one cup oatmeal. Have this with some protein such as  meat, egg, nuts    You need 3-4 carbohydrates at lunch and dinner.   This means keeping rice and pasta portions to no more than about one cup.    Have 1-2 carbohydrate for a snack.    Fruit is a good option for a snack    See the booklet I gave you today.     Good job with cutting out sweet drinks. Try to eliminate the sugar in your tea.    Taking Insulin:    1. Take NPH (Humulin-N or Novolin-N) - 7 units at bedtime as prescribed by your Endocrinologist.     - Do a 2 unit \"prime\" before each injection, be sure a stream of insulin comes out of the needle before you give your injection.    - After you inject, hold the needle under the skin to the count of 10 to be sure all of the insulin goes in.     - Rotate injection sites, keeping at least 1 inch apart from last injection site and 2 inches away from belly button or surgical scars.    2. Pen - Use a new pen needle for each injection. Always remove pen needle from the insulin pen after use and do not store insulin pens with the needle on the pen.     3. Store insulin you are not using in the refrigerator (do not freeze). Take new insulin out of the refrigerator a few hours prior to use to bring to room temperature.     4. Once opened NPH Pens (Humulin N or Novolin N) can be kept at room temperature for 14 days after opened.. Do not use the opened insulin after this time has passed, even if there is still medicine inside.     5. Always carry your blood sugar meter and a sugar source like glucose tablets with you in case of a low blood sugar. Treat a low blood sugar (less than 70) with 15 grams of carbohydrate (1 carb choice). Wait 15 minutes, recheck blood sugar. If blood sugar is still below 70, repeat the treatment.    6. Wear Medical ID or carry a wallet card stating you have Diabetes.    7. Call your doctor or diabetes educator if you begin " having low blood sugars or if you have questions or concerns.     8. Complete and mail in the form to inform the [place] Department of Public Safety that you have started taking insulin.    9. Follow-up: Follow-up diabetes education appointment scheduled on July 19th at 12:30pm.

## 2018-07-05 NOTE — PROGRESS NOTES
Please refer to ultrasound report under 'Imaging' Studies of 'Chart Review' tabs.    Naeem Louise M.D.

## 2018-07-05 NOTE — PROGRESS NOTES
"  Diabetes Self-Management Training - Gestational Diabetes    SUBJECTIVE/OBJECTIVE:  Lucia Lora presents today for education related to gestational diabetes.  She is accompanied by son, daughter and     Patient's gestational diabetes management related comments/concerns: Patient is concerned about elevated glucose.    Patient's emotional response to diabetes: expresses readiness to learn    Estimated Date of Delivery: Aug 22, 2018    Fasting Glucose  No results found for: GLC    1 hour OGTT  No results found for: GLU1]    3 hour OGTT    Fasting  No results found for: GLF]    1 hour  No results found for: GL1    2 hour  No results found for: GL2]    3 hour  No results found for: GL3    History   Smoking Status     Never Smoker   Smokeless Tobacco     Never Used       Lifestyle and Health Behaviors:  Physical Activity: not assessed    Nutrition:  Patient eats 3 meals and 2 snacks per day.    Breakfast:  2-3 injera with sweet tea and almond milk or 2-3 malawah or oatmeal  Lunch:  Fish or meat or chicken and vegetable with spices and sometimes a 2 potatoes for recipe. Salad. Rice or injera.   Dinner:  Beans and rice about 1 cup or eggs and 2 slices bread  Bedtime Snack:  Usually has fruit for snacks    Other time(s) food is eaten? no     Beverages: water, no juice, sweet tea    Pre-Maribell Vitamin: Yes    Supplements:    Experiencing nausea?  No     Socio/Economic/Cultural considerations:  Support System: family    Cultural Influences/Ethnic Background:  Armenian    Health Literacy/Numeracy:  \"With diabetes, it's helpful to use forms and log books to write down blood sugars and what you're eating at times to help understand how foods affect your blood sugars. With this in mind:    How confident are you at filling out medical forms, such as these by yourself?   Patient requests written information in English. States prefers English.      Health Beliefs and Attitudes:   Stage of Change: ACTION (Actively " working towards change)    ASSESSMENT:  Patient is testing her glucose.   Since her last appointment with Endocrinology her glucose is as follows:    Fasting glucose  After breakfast - 1h After lunch After dinner   114      105 108 115, 133    107 122 98 158   135 81 114              INTERVENTION:  Educational topics covered today:  GDM diagnosis, pathophysiology, Risks and Complications of GDM, Means of controlling GDM, Blood Glucose Goals, When to Call a Diabetes Educator or OB Provider, Healthy Eating During Pregnancy, Counting Carbohydrates, Meal Planning for GDM, and Physical Activity  Insulin injection technique taught using a Pen for NPH - 7 units at bedtime. Patient verbalized understanding and was able to perform an accurate return demonstration of injection technique.  Discussed mixing insulin, storage, sharps, new needle each use, site selection, action of insulin and hypoglycemia signs, symptoms and treatment.      Educational materials provided today:   Eugenio Understanding Gestational Diabetes  GDM Log Book  Sharps Disposal  Insulin Information - Humulin N     Pt verbalized understanding of concepts discussed and recommendations provided today.     PLAN:  See AVS    FOLLOW-UP:  3-4 days via phone.   In person 7/19  Time Spent: 90 minutes  Encounter type: Individual    Any diabetes medication dose changes were made via the CDE Protocol and Collaborative Practice Agreement with the patient's endocrinology provider. A copy of this encounter was shared with the provider.

## 2018-07-05 NOTE — MR AVS SNAPSHOT
After Visit Summary   7/5/2018    Lucia Lora    MRN: [id number]           Patient Information     Date Of Birth          1980        Visit Information        Provider Department      7/5/2018 11:30 AM Naeem Louise MD [facility] - [place]        Today's Diagnoses     Insulin controlled gestational diabetes mellitus (GDM) during pregnancy, antepartum    -  1       Follow-ups after your visit        Your next 10 appointments already scheduled     Jul 09, 2018  3:30 PM CDT   MFM BPP SINGLE with [email or web address]   [facility] - [facility])    [address]  [facility] [address]   [phone]            Jul 09, 2018  4:00 PM CDT   Radiology MD with JULIANA VALERIO MD   [facility] - [facility])    [address]  [facility] [address]   [phone]           Please arrive at the time given for your first appointment. This visit is used internally to schedule the physician's time during your ultrasound.            Jul 12, 2018  3:00 PM CDT   MFM BPP SINGLE with [email or web address]   [facility] - [place] ([facility])    [address]  [facility] [address]   [phone]            Jul 12, 2018  3:30 PM CDT   Radiology MD with JULIANA VALERIO MD   [facility] - [place] ([facility])    [address]  [facility] [address]   [phone]           Please arrive at the time given for your first appointment. This visit is used internally to schedule the physician's time during your ultrasound.            Jul 16, 2018  3:00 PM CDT   MFM BPP SINGLE with [email or web address]   [facility] - [place] ([facility])    [address]  [place]  [place] [address]   [phone]            Jul 16, 2018  3:30 PM CDT   Radiology MD with JULIANA VALERIO MD   [facility] - [place] ([facility])    [address]  [facility] [address]   [phone]           Please arrive at the time given for your first appointment. This visit is used internally to schedule the physician's time during your ultrasound.            Jul 19, 2018 11:45 AM CDT   [facility] BPP SINGLE with [email or web address]   [facility] - [place] ([facility])    [address]  [facility] [address]   [phone]            Jul 19, 2018 12:15 PM CDT   Radiology MD with JULIANA VALERIO MD   [facility] - [place] ([facility])    [address]  [facility] [address]   [phone]           Please arrive at the time given for your first appointment. This visit is used internally to schedule the physician's time during your ultrasound.            Jul 19, 2018 12:30 PM CDT   Office Visit with Nu Corona RD   [facility] [place],  Diabetes Education ([facility])    [facility] [address]  [address]  [facility] [address] [phone]           Bring a current list of meds and any records pertaining to this visit. For Physicals, please bring immunization records and any forms needing to be filled out. Please arrive 10 minutes early to complete paperwork.            Jul 23, 2018  3:00 PM CDT   [facility] BPP SINGLE with [email or web address]   [facility] - [place] ([facility])    [address]  [facility] [address]   [phone]              Future tests that were ordered for you today     Open Future Orders        Priority Expected Expires Ordered    [facility] BPP Single Routine  5/5/2019 7/5/2018    [facility] Comprehensive Single F/U  "Routine  2019    Maternal Fetal BPP Single Routine 2018    Maternal Fetal BPP Single Routine  2019    Maternal Fetal BPP Single Routine  2019    Maternal Fetal BPP Single Routine  2019    Maternal Fetal BPP Single Routine  2019    Maternal Fetal BPP Single Routine  2019    Maternal Fetal BPP Single Routine  2019    Maternal Fetal US Comprehensive Single F/U Routine 2018            Who to contact     If you have questions or need follow up information about today's clinic visit or your schedule please contact [facility] directly at [phone].  Normal or non-critical lab and imaging results will be communicated to you by [employer], letter or phone within 4 business days after the clinic has received the results. If you do not hear from us within 7 days, please contact the clinic through LifeBiot or phone. If you have a critical or abnormal lab result, we will notify you by phone as soon as possible.  Submit refill requests through [employer] or call your pharmacy and they will forward the refill request to us. Please allow 3 business days for your refill to be completed.          Additional Information About Your Visit        [employer] Information     [employer] lets you send messages to your doctor, view your test results, renew your prescriptions, schedule appointments and more. To sign up, go to [email or web address] . Click on \"Log in\" on the left side of the screen, which will take you to the Welcome page. Then click on \"Sign up Now\" on the right side of the page.     You will be asked to enter the access code listed below, as well as some personal information. Please follow the directions to create your username and password.     Your access code is: [email or web address]  Expires: 2018  6:31 AM     Your access code will  in 90 days. If you need help " or a new code, please call your [place] clinic or [phone].        Care EveryWhere ID     This is your Care EveryWhere ID. This could be used by other organizations to access your [place] medical records  [id number]        Your Vitals Were     Last Period                   11/15/2017            Blood Pressure from Last 3 Encounters:   07/02/18 110/74    Weight from Last 3 Encounters:   07/02/18 119.9 kg (264 lb 6.4 oz)               Primary Care Provider Office Phone # Fax #    Clinic [facility] [phone]       [address] [facility] [place] [place] [address]        Equal Access to Services     CARLENE BENÍTEZ : Hadii aad ku hadasho Soomaali, waaxda luqadaha, qaybta kaalmada adeegyada, waxkwasi engel hayaan ademegan fisher . So [facility] [phone].    ATENCIÓN: Si habla español, tiene a hu disposición servicios gratuitos de asistencia lingüística. [facility] [phone].    We comply with applicable federal civil rights laws and [place] laws. We do not discriminate on the basis of race, color, national origin, age, disability, sex, sexual orientation, or gender identity.            Thank you!     Thank you for choosing [facility]  for your care. Our goal is always to provide you with excellent care. Hearing back from our patients is one way we can continue to improve our services. Please take a few minutes to complete the written survey that you may receive in the mail after your visit with us. Thank you!             Your Updated Medication List - Protect others around you: Learn how to safely use, store and throw away your medicines at www.disposemymeds.org.          This list is accurate as of 7/5/18  1:46 PM.  Always use your most recent med list.                   Brand Name Dispense Instructions for use Diagnosis    insulin  UNIT/ML injection    HumuLIN N/NovoLIN N    9 mL    Inject 7 Units Subcutaneous At Bedtime

## 2018-07-09 ENCOUNTER — HOSPITAL ENCOUNTER (OUTPATIENT)
Dept: ULTRASOUND IMAGING | Facility: CLINIC | Age: 38
Discharge: HOME | End: 2018-07-09
Attending: OBSTETRICS & GYNECOLOGY | Admitting: OBSTETRICS & GYNECOLOGY
Payer: COMMERCIAL

## 2018-07-09 ENCOUNTER — OFFICE VISIT (OUTPATIENT)
Dept: MATERNAL FETAL MEDICINE | Facility: CLINIC | Age: 38
End: 2018-07-09
Attending: OBSTETRICS & GYNECOLOGY
Payer: COMMERCIAL

## 2018-07-09 ENCOUNTER — TELEPHONE (OUTPATIENT)
Dept: EDUCATION SERVICES | Facility: CLINIC | Age: 38
End: 2018-07-09

## 2018-07-09 DIAGNOSIS — O24.414 INSULIN CONTROLLED GESTATIONAL DIABETES MELLITUS (GDM) DURING PREGNANCY, ANTEPARTUM: Primary | ICD-10-CM

## 2018-07-09 PROCEDURE — 76819 FETAL BIOPHYS PROFIL W/O NST: CPT

## 2018-07-09 NOTE — MR AVS SNAPSHOT
After Visit Summary   7/9/2018    Lucia Lora    MRN: [id number]           Patient Information     Date Of Birth          1980        Visit Information        Provider Department      7/9/2018 4:00 PM JULIANA VALERIO MD [facility] [place]        Today's Diagnoses     Insulin controlled gestational diabetes mellitus (GDM) during pregnancy, antepartum    -  1       Follow-ups after your visit        Your next 10 appointments already scheduled     Jul 12, 2018  3:00 PM CDT   MFM BPP SINGLE with URMFMUSR1   [facility])    [address]  [facility] [address]   [phone]            Jul 12, 2018  3:30 PM CDT   Radiology MD with JULIANA VALERIO MD   [facility])    [address]  [facility] [address]   [phone]           Please arrive at the time given for your first appointment. This visit is used internally to schedule the physician's time during your ultrasound.            Jul 16, 2018  3:00 PM CDT   M BPP SINGLE with URMFMUSR2   [facility] [place] ([facility])    [address]  [facility] [address]   [phone]            Jul 16, 2018  3:30 PM CDT   Radiology MD with JULIANA VALERIO MD   [facility] [place] ([facility])    [address]  [facility] [address]   [phone]           Please arrive at the time given for your first appointment. This visit is used internally to schedule the physician's time during your ultrasound.            Jul 19, 2018 11:45 AM CDT   MFM BPP SINGLE with URMFMUSR3   [facility] [place] ([facility])    [address]  [facility]  [address]   [phone]            Jul 19, 2018 12:15 PM CDT   Radiology MD with JULIANA VALERIO MD   [facility] [place] ([facility])    [address]  [facility] [address]   [phone]           Please arrive at the time given for your first appointment. This visit is used internally to schedule the physician's time during your ultrasound.            Jul 19, 2018 12:30 PM CDT   Office Visit with Nu Corona RD   [facility], [place],  Diabetes Education ([facility])    [address] [place] [address]  [address]  [facility] [address]   [phone]           Bring a current list of meds and any records pertaining to this visit. For Physicals, please bring immunization records and any forms needing to be filled out. Please arrive 10 minutes early to complete paperwork.            Jul 23, 2018  3:00 PM CDT   IMAN CADENA SINGLE with URMFMUSR2   [facility] [place] ([facility])    [address]  [facility] [address]   [phone]            Jul 23, 2018  3:30 PM CDT   Radiology MD with JULIANA VALERIO MD   [facility] ([facility])    [address]  [facility] [address]   [phone]           Please arrive at the time given for your first appointment. This visit is used internally to schedule the physician's time during your ultrasound.            Jul 26, 2018 11:45 AM CDT   IMAN CADENA SINGLE with URMFMUSR2   [facility] [place] ([facility])    [address]  [facility] [address]   [phone]              Who to contact     If you have questions or need follow up information about today's clinic visit or your schedule please contact [facility] MATERNAL FETAL MEDICINE [facility] directly at  "[phone].  Normal or non-critical lab and imaging results will be communicated to you by MyChart, letter or phone within 4 business days after the clinic has received the results. If you do not hear from us within 7 days, please contact the clinic through StarGreetzhart or phone. If you have a critical or abnormal lab result, we will notify you by phone as soon as possible.  Submit refill requests through [employer] or call your pharmacy and they will forward the refill request to us. Please allow 3 business days for your refill to be completed.          Additional Information About Your Visit        StarGreetzhart Information     [employer] lets you send messages to your doctor, view your test results, renew your prescriptions, schedule appointments and more. To sign up, go to [email or web address] . Click on \"Log in\" on the left side of the screen, which will take you to the Welcome page. Then click on \"Sign up Now\" on the right side of the page.     You will be asked to enter the access code listed below, as well as some personal information. Please follow the directions to create your username and password.     Your access code is: [email or web address]  Expires: 2018  6:31 AM     Your access code will  in 90 days. If you need help or a new code, please call your [place] clinic or [phone].        Care EveryWhere ID     This is your Care EveryWhere ID. This could be used by other organizations to access your [place] medical records  [id number]        Your Vitals Were     Last Period                   11/15/2017            Blood Pressure from Last 3 Encounters:   18 110/74    Weight from Last 3 Encounters:   18 119.9 kg (264 lb 6.4 oz)              Today, you had the following     No orders found for display       Primary Care Provider Office Phone # Fax #    Clinic [facility] [phone]        [facility] [address]        Equal Access to Services     CARLENE BENÍTEZ AH: Hadii " jayde Mcneal, zulma seymour, madhu manolosergio alma rosasarina, waxkwasi maren walkerdotchyna fisher efe. So [facility] [phone].    ATENCIÓN: Si habla español, tiene a hu disposición servicios gratuitos de asistencia lingüística. Llame al [phone].    We comply with applicable federal civil rights laws and [place] laws. We do not discriminate on the basis of race, color, national origin, age, disability, sex, sexual orientation, or gender identity.            Thank you!     Thank you for choosing [facility]  for your care. Our goal is always to provide you with excellent care. Hearing back from our patients is one way we can continue to improve our services. Please take a few minutes to complete the written survey that you may receive in the mail after your visit with us. Thank you!             Your Updated Medication List - Protect others around you: Learn how to safely use, store and throw away your medicines at www.disposemymeds.org.          This list is accurate as of 7/9/18  4:11 PM.  Always use your most recent med list.                   Brand Name Dispense Instructions for use Diagnosis    insulin  UNIT/ML injection    HumuLIN N/NovoLIN N    9 mL    Inject 7 Units Subcutaneous At Bedtime

## 2018-07-09 NOTE — TELEPHONE ENCOUNTER
Call to patient to review glucose readings.  A [employer]  was used for this call. Message left asking patient to return call to review glucose readings. If she is unable to reach me by tomorrow she should call the endocrinology clinic to review her glucose readings as I will be out of the office 7/11-13.    Nu Corona RD, LD, CDE  7/9/2018   5:13 PM

## 2018-07-12 ENCOUNTER — HOSPITAL ENCOUNTER (OUTPATIENT)
Dept: ULTRASOUND IMAGING | Facility: CLINIC | Age: 38
Discharge: HOME | End: 2018-07-12
Attending: OBSTETRICS & GYNECOLOGY | Admitting: SOCIAL WORKER
Payer: COMMERCIAL

## 2018-07-12 ENCOUNTER — OFFICE VISIT (OUTPATIENT)
Dept: MATERNAL FETAL MEDICINE | Facility: CLINIC | Age: 38
End: 2018-07-12
Attending: OBSTETRICS & GYNECOLOGY
Payer: COMMERCIAL

## 2018-07-12 DIAGNOSIS — O24.414 INSULIN CONTROLLED GESTATIONAL DIABETES MELLITUS (GDM) DURING PREGNANCY, ANTEPARTUM: Primary | ICD-10-CM

## 2018-07-12 PROCEDURE — 76819 FETAL BIOPHYS PROFIL W/O NST: CPT

## 2018-07-12 NOTE — PROGRESS NOTES
Please see ultrasound report under imaging tab for details on ultrasound performed today.    Lucretia Haas MD  , OB/GYN  Maternal-Fetal Medicine  [email or web address]  [phone] (Academic office)  [phone] (Pager)

## 2018-07-12 NOTE — MR AVS SNAPSHOT
After Visit Summary   7/12/2018    Lucia Lora    MRN: [id number]           Patient Information     Date Of Birth          1980        Visit Information        Provider Department      7/12/2018 3:30 PM Lucretia Haas MD [facility] [place]        Today's Diagnoses     Insulin controlled gestational diabetes mellitus (GDM) during pregnancy, antepartum    -  1       Follow-ups after your visit        Your next 10 appointments already scheduled     Jul 16, 2018  3:00 PM CDT   MFM BPP SINGLE with URMFMUSR2   [facility] [place] ([facility])    [address]  [facility] [address]   [phone]            Jul 16, 2018  3:30 PM CDT   Radiology MD with JULIANA VALERIO MD   [facility] [place] ([facility])    [address]  [facility] [address]   [phone]           Please arrive at the time given for your first appointment. This visit is used internally to schedule the physician's time during your ultrasound.            Jul 19, 2018 11:45 AM CDT   DAVID BPP SINGLE with URMFMUSR4   [facility] [place] ([facility])    [address]  [facility] [address]   [phone]            Jul 19, 2018 12:15 PM CDT   Radiology MD with JULIANA VALERIO MD   [facility] [place] ([facility])    [address]  [facility] [address]   [phone]           Please arrive at the time given for your first appointment. This visit is used internally to schedule the physician's time during your ultrasound.            Jul 19, 2018 12:30 PM CDT   Office Visit with Nu Corona RD   [facility] [place],  Diabetes Education ([facility])    [address] [place] [address]  [address]  [facility] [address]   [phone]           Bring a current list of meds and any records pertaining to this visit. For Physicals, please bring immunization records and any forms needing to be filled out. Please arrive 10 minutes early to complete paperwork.            Jul 23, 2018  3:00 PM CDT   DAVID CADENA SINGLE with URMFMUSR2   [facility] [place] ([facility])    [address]  [facility] [address]   [phone]            Jul 23, 2018  3:30 PM CDT   Radiology MD with JULIANA VALERIO MD   [facility] - [facility])    [address]  [facility] [address]   [phone]           Please arrive at the time given for your first appointment. This visit is used internally to schedule the physician's time during your ultrasound.            Jul 26, 2018 11:45 AM CDT   DAVID CADENA SINGLE with URMFMUSR3   [facility] - [place] ([facility])    [address]  [facility] [address]   [phone]            Jul 26, 2018 12:15 PM CDT   Radiology MD with JULIANA VALERIO MD   [facility] [place] ([facility])    [address]  [facility] [address]   [phone]           Please arrive at the time given for your first appointment. This visit is used internally to schedule the physician's time during your ultrasound.            Jul 30, 2018 11:00 AM CDT   (Arrive by 10:45 AM)   RETURN DIABETES with Sharri Langford MD   [facility] End[facility]inology ([facility])    [address] [facility] [address]  3rd Floor  [facility] [id number] [phone]              Who to contact     If you have questions or need follow up information about today's clinic visit or your schedule please contact [facility] MATERNAL FETAL MEDICINE -  "[place] directly at [phone].  Normal or non-critical lab and imaging results will be communicated to you by MyChart, letter or phone within 4 business days after the clinic has received the results. If you do not hear from us within 7 days, please contact the clinic through Trubateshart or phone. If you have a critical or abnormal lab result, we will notify you by phone as soon as possible.  Submit refill requests through [employer] or call your pharmacy and they will forward the refill request to us. Please allow 3 business days for your refill to be completed.          Additional Information About Your Visit        [employer] Information     [employer] lets you send messages to your doctor, view your test results, renew your prescriptions, schedule appointments and more. To sign up, go to [email or web address] . Click on \"Log in\" on the left side of the screen, which will take you to the Welcome page. Then click on \"Sign up Now\" on the right side of the page.     You will be asked to enter the access code listed below, as well as some personal information. Please follow the directions to create your username and password.     Your access code is: [email or web address]  Expires: 2018  6:31 AM     Your access code will  in 90 days. If you need help or a new code, please call your [place] clinic or [phone].        Care EveryWhere ID     This is your Care EveryWhere ID. This could be used by other organizations to access your [place] medical records  [id number]        Your Vitals Were     Last Period                   11/15/2017            Blood Pressure from Last 3 Encounters:   18 110/74    Weight from Last 3 Encounters:   18 119.9 kg (264 lb 6.4 oz)              Today, you had the following     No orders found for display       Primary Care Provider Office Phone # Fax #    [facility] [phone]        [facility] [address]        Equal Access to Services     " CARLENE BENÍTEZ : Hadii aad devang gela Mcneal, wasaturninoda luqadaha, qaybta kasergio alma rosaumakiran, mis walkerdotchyna wilks. So [facility] [phone].    ATENCIÓN: Si habla español, tiene a hu disposición servicios gratuitos de asistencia lingüística. Llame al [phone].    We comply with applicable federal civil rights laws and [place] laws. We do not discriminate on the basis of race, color, national origin, age, disability, sex, sexual orientation, or gender identity.            Thank you!     Thank you for choosing [facility]  for your care. Our goal is always to provide you with excellent care. Hearing back from our patients is one way we can continue to improve our services. Please take a few minutes to complete the written survey that you may receive in the mail after your visit with us. Thank you!             Your Updated Medication List - Protect others around you: Learn how to safely use, store and throw away your medicines at www.disposemymeds.org.          This list is accurate as of 7/12/18  4:11 PM.  Always use your most recent med list.                   Brand Name Dispense Instructions for use Diagnosis    insulin  UNIT/ML injection    HumuLIN N/NovoLIN N    9 mL    Inject 7 Units Subcutaneous At Bedtime

## 2018-07-16 ENCOUNTER — OFFICE VISIT (OUTPATIENT)
Dept: MATERNAL FETAL MEDICINE | Facility: CLINIC | Age: 38
End: 2018-07-16
Attending: OBSTETRICS & GYNECOLOGY
Payer: COMMERCIAL

## 2018-07-16 ENCOUNTER — HOSPITAL ENCOUNTER (OUTPATIENT)
Dept: ULTRASOUND IMAGING | Facility: CLINIC | Age: 38
Discharge: HOME | End: 2018-07-16
Attending: OBSTETRICS & GYNECOLOGY | Admitting: OBSTETRICS & GYNECOLOGY
Payer: COMMERCIAL

## 2018-07-16 DIAGNOSIS — O24.414 INSULIN CONTROLLED GESTATIONAL DIABETES MELLITUS (GDM) DURING PREGNANCY, ANTEPARTUM: Primary | ICD-10-CM

## 2018-07-16 PROCEDURE — 76819 FETAL BIOPHYS PROFIL W/O NST: CPT

## 2018-07-16 NOTE — PROGRESS NOTES
"Please see \"Imaging\" tab under \"Chart Review\" for details of today's US.    Jessica Foley, DO    "

## 2018-07-16 NOTE — MR AVS SNAPSHOT
After Visit Summary   7/16/2018    Lucia Lora    MRN: [id number]           Patient Information     Date Of Birth          1980        Visit Information        Provider Department      7/16/2018 3:30 PM Jessica Foley DO [facility] [place]        Today's Diagnoses     Insulin controlled gestational diabetes mellitus (GDM) during pregnancy, antepartum    -  1       Follow-ups after your visit        Your next 10 appointments already scheduled     Jul 19, 2018 11:45 AM CDT   IMAN CADENA SINGLE with URMFMUSR4   [facility] [place] ([facility])    [address]  [facility] [address] [phone]            Jul 19, 2018 12:15 PM CDT   Radiology MD with JULIANA VALERIO MD   [facility])    [address]  [facility] [address]   [phone]           Please arrive at the time given for your first appointment. This visit is used internally to schedule the physician's time during your ultrasound.            Jul 19, 2018 12:15 PM CDT   Office Visit with Nu Corona RD   [facility], [place],  Diabetes Education ([facility])    [address] [place] [address]  [address] [place] [id number] [phone]           Bring a current list of meds and any records pertaining to this visit. For Physicals, please bring immunization records and any forms needing to be filled out. Please arrive 10 minutes early to complete paperwork.            Jul 23, 2018  3:00 PM CDT   IMAN CADENA SINGLE with URMFMUSR2   [facility] [place] ([facility])    [address]  [facility] [address] [phone]            Jul 23, 2018  3:30 PM CDT   Radiology MD with JULIANA VALERIO MD   [facility]  [place] ([facility])    [address]  [facility] [address]   [phone]           Please arrive at the time given for your first appointment. This visit is used internally to schedule the physician's time during your ultrasound.            Jul 26, 2018 11:45 AM CDT   IMAN BPP SINGLE with URMFMUSR3   [facility] [place] ([facility])    [address]  [facility] [address]   [phone]            Jul 26, 2018 12:15 PM CDT   Radiology MD with JULIANA VALERIO MD   [facility] - [facility])    [address]  [facility] [address]   [phone]           Please arrive at the time given for your first appointment. This visit is used internally to schedule the physician's time during your ultrasound.            Jul 30, 2018 11:00 AM CDT   (Arrive by 10:45 AM)   RETURN DIABETES with Sharri Langford MD   [facility] Endocrinology ([facility] [place])    [address] [place] [address]   [phone]            Jul 30, 2018  3:00 PM CDT   DAVID BPP SINGLE with URMFMUSR3   [facility] - [place] ([facility])    [address]  [facility] [address]   [phone]            Jul 30, 2018  3:30 PM CDT   Radiology MD with JULIANA VALERIO MD   [facility] - [facility])    [address]  [facility] [address]   [phone]           Please arrive at the time given for your first appointment. This visit is used internally to schedule the physician's time during your ultrasound.              Who to contact     If you have questions or need follow up information about today's clinic visit or your schedule please contact [facility] MATERNAL FETAL MEDICINE -  "[place] directly at [phone].  Normal or non-critical lab and imaging results will be communicated to you by MyChart, letter or phone within 4 business days after the clinic has received the results. If you do not hear from us within 7 days, please contact the clinic through m-Care Technology[employer] or phone. If you have a critical or abnormal lab result, we will notify you by phone as soon as possible.  Submit refill requests through [employer] or call your pharmacy and they will forward the refill request to us. Please allow 3 business days for your refill to be completed.          Additional Information About Your Visit        m-Care Technology[employer] Information     [employer] lets you send messages to your doctor, view your test results, renew your prescriptions, schedule appointments and more. To sign up, go to [email or web address] . Click on \"Log in\" on the left side of the screen, which will take you to the Welcome page. Then click on \"Sign up Now\" on the right side of the page.     You will be asked to enter the access code listed below, as well as some personal information. Please follow the directions to create your username and password.     Your access code is: [email or web address]  Expires: 2018  6:31 AM     Your access code will  in 90 days. If you need help or a new code, please call your [place] clinic or [phone].        Care EveryWhere ID     This is your Care EveryWhere ID. This could be used by other organizations to access your [place] medical records  [id number]        Your Vitals Were     Last Period                   11/15/2017            Blood Pressure from Last 3 Encounters:   18 110/74    Weight from Last 3 Encounters:   18 119.9 kg (264 lb 6.4 oz)              Today, you had the following     No orders found for display       Primary Care Provider Office Phone # Fax #    [facility] [phone]        [facility] [address]        Equal Access to Services     " CARLENE BENÍTEZ : Hadii aad devang gela Mcneal, wasaturninoda luqadaha, qaybta kasergio alma rosaumakiran, mis walkerdotchyna wilks. So [facility] [phone].    ATENCIÓN: Si habla español, tiene a hu disposición servicios gratuitos de asistencia lingüística. Llame al [phone].    We comply with applicable federal civil rights laws and [place] laws. We do not discriminate on the basis of race, color, national origin, age, disability, sex, sexual orientation, or gender identity.            Thank you!     Thank you for choosing [facility]  for your care. Our goal is always to provide you with excellent care. Hearing back from our patients is one way we can continue to improve our services. Please take a few minutes to complete the written survey that you may receive in the mail after your visit with us. Thank you!             Your Updated Medication List - Protect others around you: Learn how to safely use, store and throw away your medicines at www.disposemymeds.org.          This list is accurate as of 7/16/18  4:44 PM.  Always use your most recent med list.                   Brand Name Dispense Instructions for use Diagnosis    insulin  UNIT/ML injection    HumuLIN N/NovoLIN N    9 mL    Inject 7 Units Subcutaneous At Bedtime

## 2018-07-19 ENCOUNTER — OFFICE VISIT (OUTPATIENT)
Dept: MATERNAL FETAL MEDICINE | Facility: CLINIC | Age: 38
End: 2018-07-19
Attending: OBSTETRICS & GYNECOLOGY
Payer: COMMERCIAL

## 2018-07-19 ENCOUNTER — HOSPITAL ENCOUNTER (OUTPATIENT)
Dept: ULTRASOUND IMAGING | Facility: CLINIC | Age: 38
Discharge: HOME | End: 2018-07-19
Attending: OBSTETRICS & GYNECOLOGY | Admitting: OBSTETRICS & GYNECOLOGY
Payer: COMMERCIAL

## 2018-07-19 DIAGNOSIS — O24.414 INSULIN CONTROLLED GESTATIONAL DIABETES MELLITUS (GDM) DURING PREGNANCY, ANTEPARTUM: Primary | ICD-10-CM

## 2018-07-19 PROCEDURE — 76819 FETAL BIOPHYS PROFIL W/O NST: CPT

## 2018-07-19 NOTE — PROGRESS NOTES
"Please see \"Imaging\" tab under \"Chart Review\" for details of today's US at the [facility].    Chan Rojas MD  Maternal-Fetal Medicine      "

## 2018-07-19 NOTE — MR AVS SNAPSHOT
After Visit Summary   7/19/2018    Lucia Lora    MRN: [id number]           Patient Information     Date Of Birth          1980        Visit Information        Provider Department      7/19/2018 12:15 PM Chan Rojas MD [facility] Maternal Fetal Medicine - [place]        Today's Diagnoses     Insulin controlled gestational diabetes mellitus (GDM) during pregnancy, antepartum    -  1       Follow-ups after your visit        Your next 10 appointments already scheduled     Jul 23, 2018  3:00 PM CDT   [facility] BPP SINGLE with URMFMUSR2   [facility] - [facility])    [address]  [facility] [place] [facility] [address]   [phone]            Jul 23, 2018  3:30 PM CDT   Radiology MD with JULIANA VALERIO MD   [facility] Maternal Fetal Medicine - [facility])    [address]  [facility] [address]   [phone]           Please arrive at the time given for your first appointment. This visit is used internally to schedule the physician's time during your ultrasound.            Jul 26, 2018 11:45 AM CDT   [facility] BPP SINGLE with URMFMUSR3   [facility]aternal Fetal Medicine Ultrasound - [place] ([facility])    [address]  [facility] [address] [phone]            Jul 26, 2018 12:15 PM CDT   Radiology MD with JULIANA VALERIO MD   [facility] Maternal Fetal Medicine - [facility])    [address]  [facility] [address]   [phone]           Please arrive at the time given for your first appointment. This visit is used internally to schedule the physician's time during your ultrasound.            Jul 30, 2018 10:45 AM CDT   RETURN DIABETES with Sharri Langford MD   [facility] Endocrinology ([facility])    [address] [place] [address]    [phone]            Jul 30, 2018  3:00 PM CDT   [facility] BPP SINGLE with URMFMUSR3   [facility] [place] ([facility])    [address]  [facility] [address] [phone]            Jul 30, 2018  3:30 PM CDT   Radiology MD with JULIANA VALERIO MD   [facility])    [address]  [facility] [address]   [phone]           Please arrive at the time given for your first appointment. This visit is used internally to schedule the physician's time during your ultrasound.            Aug 02, 2018 11:45 AM CDT   IMAN US COMPRE SINGLE F/U with URMFMUSR1   [facility] [place] ([facility])    [address]  [facility] [id number] [phone]           Wear comfortable clothes and leave your valuables at home.            Aug 02, 2018 12:15 PM CDT   Radiology MD with JULIANA VALERIO MD   [facility] [place] ([facility])    [address]  [facility] [address]   [phone]           Please arrive at the time given for your first appointment. This visit is used internally to schedule the physician's time during your ultrasound.              Who to contact     If you have questions or need follow up information about today's clinic visit or your schedule please contact [facility] MATERNAL FETAL MEDICINE [facility] directly at [phone].  Normal or non-critical lab and imaging results will be communicated to you by MyChart, letter or phone within 4 business days after the clinic has received the results. If you do not hear from us within 7 days, please contact the clinic through MyChart or phone. If you have a critical or abnormal lab result, we will notify you by phone as soon as possible.  Submit refill requests through  "[employer] or call your pharmacy and they will forward the refill request to us. Please allow 3 business days for your refill to be completed.          Additional Information About Your Visit        MyChart Information     Pharnexthart lets you send messages to your doctor, view your test results, renew your prescriptions, schedule appointments and more. To sign up, go to [email or web address] . Click on \"Log in\" on the left side of the screen, which will take you to the Welcome page. Then click on \"Sign up Now\" on the right side of the page.     You will be asked to enter the access code listed below, as well as some personal information. Please follow the directions to create your username and password.     Your access code is: [email or web address]  Expires: 2018  6:31 AM     Your access code will  in 90 days. If you need help or a new code, please call your [place] clinic or [phone].        Care EveryWhere ID     This is your Care EveryWhere ID. This could be used by other organizations to access your [place] medical records  [id number]        Your Vitals Were     Last Period                   11/15/2017            Blood Pressure from Last 3 Encounters:   18 110/74    Weight from Last 3 Encounters:   18 119.9 kg (264 lb 6.4 oz)              Today, you had the following     No orders found for display       Primary Care Provider Office Phone # Fax #    Clinic [facility] [phone]        [facility] [address]        Equal Access to Services     ACRLENE BENÍTEZ AH: Hadii aad ku hadasho Soomaali, waaxda luqadaha, qaybta kaalmada adeegyada, waxay nataliyain yousuf wilks. So [facility] [phone].    ATENCIÓN: Si habla español, tiene a hu disposición servicios gratuitos de asistencia lingüística. Llame al [phone].    We comply with applicable federal civil rights laws and [place] laws. We do not discriminate on the basis of race, color, national origin, age, " disability, sex, sexual orientation, or gender identity.            Thank you!     Thank you for choosing [facility]  for your care. Our goal is always to provide you with excellent care. Hearing back from our patients is one way we can continue to improve our services. Please take a few minutes to complete the written survey that you may receive in the mail after your visit with us. Thank you!             Your Updated Medication List - Protect others around you: Learn how to safely use, store and throw away your medicines at www.disposemymeds.org.          This list is accurate as of 7/19/18 12:29 PM.  Always use your most recent med list.                   Brand Name Dispense Instructions for use Diagnosis    insulin  UNIT/ML injection    HumuLIN N/NovoLIN N    9 mL    Inject 7 Units Subcutaneous At Bedtime

## 2018-07-23 ENCOUNTER — OFFICE VISIT (OUTPATIENT)
Dept: MATERNAL FETAL MEDICINE | Facility: CLINIC | Age: 38
End: 2018-07-23
Attending: OBSTETRICS & GYNECOLOGY
Payer: COMMERCIAL

## 2018-07-23 ENCOUNTER — HOSPITAL ENCOUNTER (OUTPATIENT)
Dept: ULTRASOUND IMAGING | Facility: CLINIC | Age: 38
Discharge: HOME | End: 2018-07-23
Attending: OBSTETRICS & GYNECOLOGY | Admitting: OBSTETRICS & GYNECOLOGY
Payer: COMMERCIAL

## 2018-07-23 DIAGNOSIS — O24.414 INSULIN CONTROLLED GESTATIONAL DIABETES MELLITUS (GDM) DURING PREGNANCY, ANTEPARTUM: Primary | ICD-10-CM

## 2018-07-23 LAB — GROUP B STREP PCR: NEGATIVE

## 2018-07-23 PROCEDURE — 76819 FETAL BIOPHYS PROFIL W/O NST: CPT

## 2018-07-23 NOTE — MR AVS SNAPSHOT
After Visit Summary   7/23/2018    Lucia Lora    MRN: [id number]           Patient Information     Date Of Birth          1980        Visit Information        Provider Department      7/23/2018 3:30 PM Jessica Foley DO [facility] [place]        Today's Diagnoses     Insulin controlled gestational diabetes mellitus (GDM) during pregnancy, antepartum    -  1       Follow-ups after your visit        Your next 10 appointments already scheduled     Jul 26, 2018 11:45 AM CDT   [facility] BPP SINGLE with URMFMUSR3   [facility] [place] ([facility])    [address]  [facility] [address]   [phone]            Jul 26, 2018 12:15 PM CDT   Radiology MD with JULIANA VALERIO MD   [facility])    [address]  [place] [address]   [phone]           Please arrive at the time given for your first appointment. This visit is used internally to schedule the physician's time during your ultrasound.            Jul 30, 2018 10:45 AM CDT   RETURN DIABETES with Sharri Langford MD   [facility] Endocrinology ([facility])    [address] [place] [address]   [phone]            Jul 30, 2018  3:00 PM CDT   M BPP SINGLE with URMFMUSR3   [facility] [place] ([facility])    [address]  [facility] [address]   [phone]            Jul 30, 2018  3:30 PM CDT   Radiology MD with JULIANA VALERIO MD   [facility] [place] ([facility])    [address]  [email or web address] [place] [address]   [phone]           Please arrive at the time given for your first appointment. This visit is used internally to schedule the physician's time  "during your ultrasound.            Aug 02, 2018 11:45 AM CDT   MFM US COMPRE SINGLE F/U with URMFMUSR1   [facility] Maternal Fetal Medicine Ultrasound - [place] ([facility])    [address]  [facility] [id number] [phone]           Wear comfortable clothes and leave your valuables at home.            Aug 02, 2018 12:15 PM CDT   Radiology MD with UR IMAN MONTILLA   [facility] Maternal Fetal Medicine - [facility])    [address]  [facility] [address]   [phone]           Please arrive at the time given for your first appointment. This visit is used internally to schedule the physician's time during your ultrasound.              Who to contact     If you have questions or need follow up information about today's clinic visit or your schedule please contact [facility] MATERNAL FETAL MEDICINE [facility] directly at [phone].  Normal or non-critical lab and imaging results will be communicated to you by Invod[employer]art, letter or phone within 4 business days after the clinic has received the results. If you do not hear from us within 7 days, please contact the clinic through [employer] or phone. If you have a critical or abnormal lab result, we will notify you by phone as soon as possible.  Submit refill requests through [employer] or call your pharmacy and they will forward the refill request to us. Please allow 3 business days for your refill to be completed.          Additional Information About Your Visit        [employer] Information     [employer] lets you send messages to your doctor, view your test results, renew your prescriptions, schedule appointments and more. To sign up, go to [email or web address] . Click on \"Log in\" on the left side of the screen, which will take you to the Welcome page. Then click on \"Sign up Now\" on the right side of the page.     You will be asked to enter the access code listed below, as well as " some personal information. Please follow the directions to create your username and password.     Your access code is: [email or web address]  Expires: 2018  6:31 AM     Your access code will  in 90 days. If you need help or a new code, please call your [place] clinic or [phone].        Care EveryWhere ID     This is your Care EveryWhere ID. This could be used by other organizations to access your [place] medical records  [id number]        Your Vitals Were     Last Period                   11/15/2017            Blood Pressure from Last 3 Encounters:   18 110/74    Weight from Last 3 Encounters:   18 119.9 kg (264 lb 6.4 oz)              Today, you had the following     No orders found for display       Primary Care Provider Office Phone # Fax #    Clinic [facility] [phone]        [facility] [address]        Equal Access to Services     CARLENE BENÍTEZ : Hadii aad ku hadasho Soomaali, waaxda luqadaha, qaybta kaalmada adeegyada, waxay nataliyain haykevinn kevin fisher . So [facility] [phone].    ATENCIÓN: Si habla español, tiene a hu disposición servicios gratuitos de asistencia lingüística. Llame al [phone].    We comply with applicable federal civil rights laws and [place] laws. We do not discriminate on the basis of race, color, national origin, age, disability, sex, sexual orientation, or gender identity.            Thank you!     Thank you for choosing [facility]  for your care. Our goal is always to provide you with excellent care. Hearing back from our patients is one way we can continue to improve our services. Please take a few minutes to complete the written survey that you may receive in the mail after your visit with us. Thank you!             Your Updated Medication List - Protect others around you: Learn how to safely use, store and throw away your medicines at www.disposemymeds.org.          This list is  accurate as of 7/23/18  4:31 PM.  Always use your most recent med list.                   Brand Name Dispense Instructions for use Diagnosis    insulin  UNIT/ML injection    HumuLIN N/NovoLIN N    9 mL    Inject 7 Units Subcutaneous At Bedtime

## 2018-07-26 ENCOUNTER — HOSPITAL ENCOUNTER (OUTPATIENT)
Dept: ULTRASOUND IMAGING | Facility: CLINIC | Age: 38
Discharge: HOME | End: 2018-07-26
Attending: OBSTETRICS & GYNECOLOGY | Admitting: OBSTETRICS & GYNECOLOGY
Payer: COMMERCIAL

## 2018-07-26 ENCOUNTER — OFFICE VISIT (OUTPATIENT)
Dept: MATERNAL FETAL MEDICINE | Facility: CLINIC | Age: 38
End: 2018-07-26
Attending: OBSTETRICS & GYNECOLOGY
Payer: COMMERCIAL

## 2018-07-26 DIAGNOSIS — O24.414 INSULIN CONTROLLED GESTATIONAL DIABETES MELLITUS (GDM) DURING PREGNANCY, ANTEPARTUM: Primary | ICD-10-CM

## 2018-07-26 PROCEDURE — 76819 FETAL BIOPHYS PROFIL W/O NST: CPT

## 2018-07-26 NOTE — MR AVS SNAPSHOT
After Visit Summary   7/26/2018    Lucia Lora    MRN: [id number]           Patient Information     Date Of Birth          1980        Visit Information        Provider Department      7/26/2018 12:15 PM Lucretia Haas MD [facility] Maternal Fetal Medicine - [place]        Today's Diagnoses     Insulin controlled gestational diabetes mellitus (GDM) during pregnancy, antepartum    -  1       Follow-ups after your visit        Your next 10 appointments already scheduled     Jul 30, 2018 10:45 AM CDT   RETURN DIABETES with Sharri Langford MD   [facility] Endocrinology ([facility] [place])    [address] [place] [address]   [phone]            Jul 30, 2018  3:00 PM CDT   [facility] BPP SINGLE with URMFMUSR2   [facility])    [address]  [facility] [address] [phone]            Jul 30, 2018  3:30 PM CDT   Radiology MD with UR IMAN MONTILLA   [facility])    [address]  [facility] [address]   [phone]           Please arrive at the time given for your first appointment. This visit is used internally to schedule the physician's time during your ultrasound.            Aug 02, 2018 11:45 AM CDT   [facility] US COMPRE SINGLE F/U with URMFMUSR1   [facility] [place] ([facility])    [address]  [facility] [address] [phone]           Wear comfortable clothes and leave your valuables at home.            Aug 02, 2018 12:15 PM CDT   Radiology MD with UR IMAN MONTILLA   [facility] - [place] ([facility])    [address]  [facility] [address]   [phone]           Please arrive at the time given for your first  "appointment. This visit is used internally to schedule the physician's time during your ultrasound.              Who to contact     If you have questions or need follow up information about today's clinic visit or your schedule please contact [facility] MATERNAL FETAL MEDICINE [facility] directly at [phone].  Normal or non-critical lab and imaging results will be communicated to you by MyChart, letter or phone within 4 business days after the clinic has received the results. If you do not hear from us within 7 days, please contact the clinic through [employer] or phone. If you have a critical or abnormal lab result, we will notify you by phone as soon as possible.  Submit refill requests through [employer] or call your pharmacy and they will forward the refill request to us. Please allow 3 business days for your refill to be completed.          Additional Information About Your Visit        MyChart Information     [employer] lets you send messages to your doctor, view your test results, renew your prescriptions, schedule appointments and more. To sign up, go to [email or web address] . Click on \"Log in\" on the left side of the screen, which will take you to the Welcome page. Then click on \"Sign up Now\" on the right side of the page.     You will be asked to enter the access code listed below, as well as some personal information. Please follow the directions to create your username and password.     Your access code is: [email or web address]  Expires: 2018  6:31 AM     Your access code will  in 90 days. If you need help or a new code, please call your [place] clinic or [phone].        Care EveryWhere ID     This is your Care EveryWhere ID. This could be used by other organizations to access your [facility] medical records  [id number]        Your Vitals Were     Last Period                   11/15/2017            Blood Pressure from Last 3 Encounters:   18 110/74    Weight from Last 3 Encounters:   18 " 119.9 kg (264 lb 6.4 oz)              Today, you had the following     No orders found for display       Primary Care Provider Office Phone # Fax #    Clinic [facility] [phone]       [address] [facility] [address]        Equal Access to Services     VIRGILIOSERGIO JACKELIN : Hadii aad ku hadasho Soomaali, waaxda luqadaha, qaybta kaalmada adeegyada, waxay idiin hayaan adeeg khmarcelo laalexsanderdian wilks. So [facility] [phone].    ATENCIÓN: Si habla español, tiene a hu disposición servicios gratuitos de asistencia lingüística. Llame al [phone].    We comply with applicable federal civil rights laws and [place] laws. We do not discriminate on the basis of race, color, national origin, age, disability, sex, sexual orientation, or gender identity.            Thank you!     Thank you for choosing [facility]  for your care. Our goal is always to provide you with excellent care. Hearing back from our patients is one way we can continue to improve our services. Please take a few minutes to complete the written survey that you may receive in the mail after your visit with us. Thank you!             Your Updated Medication List - Protect others around you: Learn how to safely use, store and throw away your medicines at www.disposemymeds.org.          This list is accurate as of 7/26/18  1:34 PM.  Always use your most recent med list.                   Brand Name Dispense Instructions for use Diagnosis    insulin  UNIT/ML injection    HumuLIN N/NovoLIN N    9 mL    Inject 7 Units Subcutaneous At Bedtime

## 2018-07-26 NOTE — PROGRESS NOTES
Please see ultrasound report under imaging tab for details on ultrasound performed today.    Lucretia Haas MD  , OB/GYN  Maternal-Fetal Medicine  [email or web address]  [phone] (Academic office)  [phone] (Pager)

## 2018-07-30 ENCOUNTER — HOSPITAL ENCOUNTER (OUTPATIENT)
Dept: ULTRASOUND IMAGING | Facility: CLINIC | Age: 38
Discharge: HOME | End: 2018-07-30
Attending: OBSTETRICS & GYNECOLOGY | Admitting: OBSTETRICS & GYNECOLOGY
Payer: COMMERCIAL

## 2018-07-30 ENCOUNTER — OFFICE VISIT (OUTPATIENT)
Dept: ENDOCRINOLOGY | Facility: CLINIC | Age: 38
End: 2018-07-30
Payer: COMMERCIAL

## 2018-07-30 ENCOUNTER — OFFICE VISIT (OUTPATIENT)
Dept: MATERNAL FETAL MEDICINE | Facility: CLINIC | Age: 38
End: 2018-07-30
Attending: OBSTETRICS & GYNECOLOGY
Payer: COMMERCIAL

## 2018-07-30 VITALS
BODY MASS INDEX: 39.56 KG/M2 | HEART RATE: 116 BPM | SYSTOLIC BLOOD PRESSURE: 114 MMHG | WEIGHT: 267.1 LBS | HEIGHT: 69 IN | DIASTOLIC BLOOD PRESSURE: 79 MMHG

## 2018-07-30 DIAGNOSIS — O24.414 INSULIN CONTROLLED GESTATIONAL DIABETES MELLITUS (GDM) DURING PREGNANCY, ANTEPARTUM: Primary | ICD-10-CM

## 2018-07-30 DIAGNOSIS — E01.0 THYROMEGALY: Primary | ICD-10-CM

## 2018-07-30 DIAGNOSIS — O24.414 INSULIN CONTROLLED GESTATIONAL DIABETES MELLITUS (GDM) DURING PREGNANCY, ANTEPARTUM: ICD-10-CM

## 2018-07-30 LAB — HBA1C MFR BLD: 6 % (ref 4.3–6)

## 2018-07-30 PROCEDURE — 76819 FETAL BIOPHYS PROFIL W/O NST: CPT

## 2018-07-30 RX ORDER — PRENATAL VIT/IRON FUM/FOLIC AC 27MG-0.8MG
1 TABLET ORAL
COMMUNITY
Start: 2017-12-28

## 2018-07-30 ASSESSMENT — PAIN SCALES - GENERAL: PAINLEVEL: NO PAIN (0)

## 2018-07-30 NOTE — MR AVS SNAPSHOT
After Visit Summary   7/30/2018    Lucia Lora    MRN: [id number]           Patient Information     Date Of Birth          1980        Visit Information        Provider Department      7/30/2018 3:30 PM Jessica Foley DO [facility] Maternal Fetal Medicine [facility]        Today's Diagnoses     Insulin controlled gestational diabetes mellitus (GDM) during pregnancy, antepartum    -  1       Follow-ups after your visit        Your next 10 appointments already scheduled     Aug 02, 2018 11:45 AM CDT   MFM US COMPRE SINGLE F/U with [id number]   [facility] Maternal Fetal Medicine Ultrasound - [facility])    [address]  [facility] [id number] [phone]           Wear comfortable clothes and leave your valuables at home.            Aug 02, 2018 12:15 PM CDT   Radiology MD with UR MFM MD   [facility] Maternal Fetal Medicine - [facility])    [address]  [facility] [id number] [phone]           Please arrive at the time given for your first appointment. This visit is used internally to schedule the physician's time during your ultrasound.            Nov 12, 2018 10:00 AM CST   (Arrive by 9:45 AM)   RETURN DIABETES with Sharri Langford MD   [facility] Endocrinology ([facility])    [address] [place] [id number] [phone]              Future tests that were ordered for you today     Open Future Orders        Priority Expected Expires Ordered    TSH with free T4 reflex Routine 7/30/2018 7/30/2019 7/30/2018            Who to contact     If you have questions or need follow up information about today's clinic visit or your schedule please contact [facility] MATERNAL FETAL MEDICINE [facility] directly at [phone].  Normal or non-critical lab and imaging results will be communicated to you by MyChart, letter or phone  "within 4 business days after the clinic has received the results. If you do not hear from us within 7 days, please contact the clinic through Rezzcard or phone. If you have a critical or abnormal lab result, we will notify you by phone as soon as possible.  Submit refill requests through Rezzcard or call your pharmacy and they will forward the refill request to us. Please allow 3 business days for your refill to be completed.          Additional Information About Your Visit        Rezzcard Information     Rezzcard lets you send messages to your doctor, view your test results, renew your prescriptions, schedule appointments and more. To sign up, go to [email or web address] . Click on \"Log in\" on the left side of the screen, which will take you to the Welcome page. Then click on \"Sign up Now\" on the right side of the page.     You will be asked to enter the access code listed below, as well as some personal information. Please follow the directions to create your username and password.     Your access code is: [email or web address]  Expires: 2018  6:31 AM     Your access code will  in 90 days. If you need help or a new code, please call your [place] clinic or [phone].        Care EveryWhere ID     This is your Care EveryWhere ID. This could be used by other organizations to access your [place] medical records  [id number]        Your Vitals Were     Last Period                   11/15/2017            Blood Pressure from Last 3 Encounters:   18 114/79   18 110/74    Weight from Last 3 Encounters:   18 121.2 kg (267 lb 1.6 oz)   18 119.9 kg (264 lb 6.4 oz)              Today, you had the following     No orders found for display         Today's Medication Changes          These changes are accurate as of 18  4:12 PM.  If you have any questions, ask your nurse or doctor.               These medicines have changed or have updated prescriptions.        Dose/Directions    insulin  " UNIT/ML injection   Commonly known as:  HumuLIN N/NovoLIN N   This may have changed:    - how much to take  - how to take this  - when to take this  - additional instructions   Changed by:  Sharri Langford MD        Administer 7 unit(s) in the morning (9 AM) and 10 U at bedtime (9 PM)   Quantity:  21 mL   Refills:  3            Where to get your medicines      These medications were sent to [facility] [facility] - [place], [place] - [address] [place] [address]  [address] [facility] [address]     Phone:  [phone]     insulin  UNIT/ML injection                Primary Care Provider Office Phone # Fax #    Clinic [facility] [phone]       [address] [facility] [address]        Equal Access to Services     CARLENE BENÍTEZ : Hadii aad ku hadasho Sobenitoali, waaxda luqadaha, qaybta kaalmada adeegyada, mis wilks. So [facility] [phone].    ATENCIÓN: Si habla español, tiene a hu disposición servicios gratuitos de asistencia lingüística. [facility] [phone].    We comply with applicable federal civil rights laws and [place] laws. We do not discriminate on the basis of race, color, national origin, age, disability, sex, sexual orientation, or gender identity.            Thank you!     Thank you for choosing [facility]  for your care. Our goal is always to provide you with excellent care. Hearing back from our patients is one way we can continue to improve our services. Please take a few minutes to complete the written survey that you may receive in the mail after your visit with us. Thank you!             Your Updated Medication List - Protect others around you: Learn how to safely use, store and throw away your medicines at www.disposemymeds.org.          This list is accurate as of 7/30/18  4:12 PM.  Always use your most recent med list.                   Brand Name Dispense Instructions for use Diagnosis    insulin NPH  100 UNIT/ML injection    HumuLIN N/NovoLIN N    21 mL    Administer 7 unit(s) in the morning (9 AM) and 10 U at bedtime (9 PM)        prenatal multivitamin plus iron 27-0.8 MG Tabs per tablet      Take 1 tablet by mouth

## 2018-07-30 NOTE — PROGRESS NOTES
The patient is seen in follow up. She was initially referred by Dr. Ashley Fowler for evaluation of gestational diabetes. Her last visit was 7/2/18    Lucia Lora is a 38 year old Beninese female, with a nonsignificant past medical history, diagnosed with gestational diabetes approximately 2 months ago.  She is now almost 37 weeks pregnant.  This is her fourth pregnancy.  She immigrated from [place], 8 years ago.  Denies being diagnosed with diabetes or gestational diabetes prior to this pregnancy.  Her third child was born in the [place], with a birth weight of 8 or 9 pounds.  She does not remember the birth weight of her other children.    Hemoglobin A1c, one month ago, was 6.1%. It was 6.0% today.   Review of prior records reveal an A1c of 5.9%, back in 2015.  The patient was not pregnant at that time.    At her last visit, she was started on NPH 7u at bedtime. We briefly reviewed with the carbohydrate content of her main meals, though she is not very clear about her habits.  In a regular day, she has 2 meals, breakfast and lunch.  Breakfast is around 10-11 AM, lunch around 3 PM.  For breakfast, she has Ingera or regular bread as the main source of carbohydrates, but sometimes only drinks tea.  For lunch, she frequently has rice or pasta, with veggies and meat.  In the evening, she prefers to snack, frequently on fruits (likes apples and grapes or oranges).   She reports not eating late in the evening because she gets GERD and this disrupts her sleep. She met with the nutritionist on 7/5/18 who further reinforced recommendations.     The glucometer readings revealed that she typically checks her blood sugar twice daily, before breakfast and/or 1-2 hours after eating.  Morning blood sugar is consistently above 100, around 105-110.  She does have higher BG values midday, but it is difficult to decipher if they are premeal, 1h or 2h post meal. Her early evening BG is in fair range, but prior to bedtime  she is higher, despite denying eating a dinner meal.  In the evening, if her blood sugar is below 130, she might have a snack.  If it is above 140, she refrains from eating, as she is afraid the hyperglycemia will worsen.  The average blood glucose on the meter is 126, with a standard deviation of 26 and a range variable between 81 and 178.    Review of outside lab work reveal a TSH of 3.51 in 2015.  The patient denies a personal or family history of thyroid disorders.  She currently feels good and she does not endorse signs or symptoms suggestive of hypo-or hyperthyroidism. She was supposed to get TSH drawn at her last visit, but did not stop at lab.     Past Medical History   Gestational diabetes     Past Surgical Hystory   No past surgical history on file.    Current Medications   Prescription Medications as of 7/30/2018             insulin NPH (HUMULIN N/NOVOLIN N) 100 UNIT/ML injection Inject 7 Units Subcutaneous At Bedtime      She is medicated with 5000 units vitamin D daily  Prescription Medications as of 7/30/2018             insulin NPH (HUMULIN N/NOVOLIN N) 100 UNIT/ML injection Inject 7 Units Subcutaneous At Bedtime          Family History   No significantly family history.     Social History   with 3 children. She denies smoking, drinking alcohol or using illicit drugs. Occupation: care provider.     Review of Systems   Systemic:              Occasional fatigue - mainly after eating  Eye:                       No eye symptoms, no blurred vision  Sedrick-Laryngeal:      No sedrick-laryngeal symptoms, no dysphagia, no voice hoarseness, no cough      Breast:                   No breast symptoms  Cardiovascular:     No cardiovascular symptoms, no CP or palpitations   Pulmonary:            No pulmonary symptoms, no cough or SOB    Gastrointestinal:    No diarrhea or constipation, occasional GERD  Genitourinary:        Increased thirst, urinates 2 times a night    Endocrine:             No endocrine symptoms,  "no heat or cold intolerance   Neurological:          No neurological symptoms, no headaches, no tremor, no dizziness     Musculoskeletal:    No musculoskeletal symptoms, no muscle or joint pain   Skin:                       No skin symptoms   Psychological:       No psychological symptoms                Vital Signs     Previous Weights:    Wt Readings from Last 10 Encounters:   07/30/18 121.2 kg (267 lb 1.6 oz)   07/02/18 119.9 kg (264 lb 6.4 oz)                   /79  Pulse 116  Ht 1.753 m (5' 9.02\")  Wt 121.2 kg (267 lb 1.6 oz)  LMP 11/15/2017  BMI 39.43 kg/m2    Physical Exam  General Appearance:  she is well developed, pregnant/overweight   Eyes:  conjutivae and extra-ocular motions are normal.                                    pupils round and reactive to light, no lid lag, no stare    HEENT:   oropharynx clear and moist, no JVD, no bruits     Mild thyromegaly, symmetrical, no palpable nodules   Cardiovascular:  regular rhythm, no murmurs, distal pulse palpable, no edema  Respiratory:       chest clear, no rales, no rhonchi   Gastrointestinal: abdomen pregnant, normal bowel sounds, stretch marks   musculoskeletal: normal tone and strength  Psychiatric: affect and judgment normal  Skin: Acanthosis nigricans of the flexural areas  Neurological: reflexes normal and symmetric, no resting tremor     Lab Results  I reviewed prior lab results documented in Epic.  Lab Results   Component Value Date    HEMOGLOBINA1 6.1 (A) 07/02/2018       Hemoglobin   Date Value Ref Range Status   02/12/2011 10.3 (L) 11.7 - 15.7 g/dL Final     Hematocrit   Date Value Ref Range Status   02/10/2011 34.4 (L) 35.0 - 47.0 % Final     Assessment     1.  Gestational diabetes, diagnosed in the third trimester in a 38-year-old female, now 37 weeks pregnant.  It is possible that she actually had prediabetes prior to this pregnancy, as her A1c was slightly elevated back in 2015.  Most blood sugars on meter download are above goal " range for pregnancy.     Recommendations:  -increase NPH to 7u at ~9am, 10u at ~9pm.   -continue to check BG pre breakfast and 1 hr postprandially and have the meter downloaded in the clinic in 1 week.     2.  Mild thyromegaly  Most likely normal, in the context of pregnancy.  However, given the high normal TSH of 3.51 on prior lab work, we recommended to have a reflex TSH done today and the patient agreed.  Clinically, she does not endorse signs or symptoms suggestive of hypothyroidism.  - reflex TSH today     Orders Placed This Encounter   Procedures     TSH with free T4 reflex     Hemoglobin A1c POCT        Blaire Diamond MD  Fellow in Diabetes, Endocrinology, and Metabolism  PGY5  Pager [phone]    Pt seen and discussed with attending Dr. Langford.    I have personally examined the patient, reviewed and edited the fellow's note and agree with the plan of care.    Sharri Lanfgord MD.

## 2018-07-30 NOTE — LETTER
7/30/2018       RE: Lucia Lora  [address]  [facility] [address]     Dear Colleague,    Thank you for referring your patient, Lucia Lora, to the [facility] ENDOCRINOLOGY at [facility]. Please see a copy of my visit note below.      The patient is seen in follow up. She was initially referred by Dr. Ashley Fowler for evaluation of gestational diabetes. Her last visit was 7/2/18    Lucia Lora is a 38 year old American female, with a nonsignificant past medical history, diagnosed with gestational diabetes approximately 2 months ago.  She is now almost 37 weeks pregnant.  This is her fourth pregnancy.  She immigrated from [place], 8 years ago.  Denies being diagnosed with diabetes or gestational diabetes prior to this pregnancy.  Her third child was born in the [place], with a birth weight of 8 or 9 pounds.  She does not remember the birth weight of her other children.    Hemoglobin A1c, one month ago, was 6.1%. It was 6.0% today.   Review of prior records reveal an A1c of 5.9%, back in 2015.  The patient was not pregnant at that time.    At her last visit, she was started on NPH 7u at bedtime. We briefly reviewed with the carbohydrate content of her main meals, though she is not very clear about her habits.  In a regular day, she has 2 meals, breakfast and lunch.  Breakfast is around 10-11 AM, lunch around 3 PM.  For breakfast, she has Ingera or regular bread as the main source of carbohydrates, but sometimes only drinks tea.  For lunch, she frequently has rice or pasta, with veggies and meat.  In the evening, she prefers to snack, frequently on fruits (likes apples and grapes or oranges).   She reports not eating late in the evening because she gets GERD and this disrupts her sleep. She met with the nutritionist on 7/5/18 who further reinforced recommendations.     The glucometer readings revealed that she typically checks her blood sugar twice  daily, before breakfast and/or 1-2 hours after eating.  Morning blood sugar is consistently above 100, around 105-110.  She does have higher BG values midday, but it is difficult to decipher if they are premeal, 1h or 2h post meal. Her early evening BG is in fair range, but prior to bedtime she is higher, despite denying eating a dinner meal.  In the evening, if her blood sugar is below 130, she might have a snack.  If it is above 140, she refrains from eating, as she is afraid the hyperglycemia will worsen.  The average blood glucose on the meter is 126, with a standard deviation of 26 and a range variable between 81 and 178.    Review of outside lab work reveal a TSH of 3.51 in 2015.  The patient denies a personal or family history of thyroid disorders.  She currently feels good and she does not endorse signs or symptoms suggestive of hypo-or hyperthyroidism. She was supposed to get TSH drawn at her last visit, but did not stop at lab.     Past Medical History   Gestational diabetes     Past Surgical Hystory   No past surgical history on file.    Current Medications   Prescription Medications as of 7/30/2018             insulin NPH (HUMULIN N/NOVOLIN N) 100 UNIT/ML injection Inject 7 Units Subcutaneous At Bedtime      She is medicated with 5000 units vitamin D daily  Prescription Medications as of 7/30/2018             insulin NPH (HUMULIN N/NOVOLIN N) 100 UNIT/ML injection Inject 7 Units Subcutaneous At Bedtime          Family History   No significantly family history.     Social History   with 3 children. She denies smoking, drinking alcohol or using illicit drugs. Occupation: care provider.     Review of Systems   Systemic:              Occasional fatigue - mainly after eating  Eye:                       No eye symptoms, no blurred vision  Sedrick-Laryngeal:      No sedrick-laryngeal symptoms, no dysphagia, no voice hoarseness, no cough      Breast:                   No breast symptoms  Cardiovascular:     No  "cardiovascular symptoms, no CP or palpitations   Pulmonary:            No pulmonary symptoms, no cough or SOB    Gastrointestinal:    No diarrhea or constipation, occasional GERD  Genitourinary:        Increased thirst, urinates 2 times a night    Endocrine:             No endocrine symptoms, no heat or cold intolerance   Neurological:          No neurological symptoms, no headaches, no tremor, no dizziness     Musculoskeletal:    No musculoskeletal symptoms, no muscle or joint pain   Skin:                       No skin symptoms   Psychological:       No psychological symptoms                Vital Signs     Previous Weights:    Wt Readings from Last 10 Encounters:   07/30/18 121.2 kg (267 lb 1.6 oz)   07/02/18 119.9 kg (264 lb 6.4 oz)                   /79  Pulse 116  Ht 1.753 m (5' 9.02\")  Wt 121.2 kg (267 lb 1.6 oz)  LMP 11/15/2017  BMI 39.43 kg/m2    Physical Exam  General Appearance:  she is well developed, pregnant/overweight   Eyes:  conjutivae and extra-ocular motions are normal.                                    pupils round and reactive to light, no lid lag, no stare    HEENT:   oropharynx clear and moist, no JVD, no bruits     Mild thyromegaly, symmetrical, no palpable nodules   Cardiovascular:  regular rhythm, no murmurs, distal pulse palpable, no edema  Respiratory:       chest clear, no rales, no rhonchi   Gastrointestinal: abdomen pregnant, normal bowel sounds, stretch marks   musculoskeletal: normal tone and strength  Psychiatric: affect and judgment normal  Skin: Acanthosis nigricans of the flexural areas  Neurological: reflexes normal and symmetric, no resting tremor     Lab Results  I reviewed prior lab results documented in Epic.  Lab Results   Component Value Date    HEMOGLOBINA1 6.1 (A) 07/02/2018       Hemoglobin   Date Value Ref Range Status   02/12/2011 10.3 (L) 11.7 - 15.7 g/dL Final     Hematocrit   Date Value Ref Range Status   02/10/2011 34.4 (L) 35.0 - 47.0 % Final "     Assessment     1.  Gestational diabetes, diagnosed in the third trimester in a 38-year-old female, now 37 weeks pregnant.  It is possible that she actually had prediabetes prior to this pregnancy, as her A1c was slightly elevated back in 2015.  Most blood sugars on meter download are above goal range for pregnancy.     Recommendations:  -increase NPH to 7u at ~9am, 10u at ~9pm.   -continue to check BG pre breakfast and 1 hr postprandially and have the meter downloaded in the clinic in 1 week.     2.  Mild thyromegaly  Most likely normal, in the context of pregnancy.  However, given the high normal TSH of 3.51 on prior lab work, we recommended to have a reflex TSH done today and the patient agreed.  Clinically, she does not endorse signs or symptoms suggestive of hypothyroidism.  - reflex TSH today     Orders Placed This Encounter   Procedures     TSH with free T4 reflex     Hemoglobin A1c POCT        Blaire Diamond MD  Fellow in Diabetes, Endocrinology, and Metabolism  PGY5  Pager [phone]    Pt seen and discussed with attending Dr. Langford.    I have personally examined the patient, reviewed and edited the fellow's note and agree with the plan of care.    Sharri Langford MD.

## 2018-07-30 NOTE — MR AVS SNAPSHOT
After Visit Summary   7/30/2018    Lucia Lora    MRN: [id number]           Patient Information     Date Of Birth          1980        Visit Information        Provider Department      7/30/2018 10:45 AM Sharri Langford MD; [employer] [facility] Endocrinology        Today's Diagnoses     Thyromegaly    -  1    Insulin controlled gestational diabetes mellitus (GDM) during pregnancy, antepartum           Follow-ups after your visit        Follow-up notes from your care team     Return in about 3 months (around 10/30/2018) for labs today.      Your next 10 appointments already scheduled     Jul 30, 2018  3:30 PM CDT   Radiology MD with JULIANA VALERIO MD   [facility] Maternal Fetal Medicine - [facility])    [address]  [facility] [address]   [phone]           Please arrive at the time given for your first appointment. This visit is used internally to schedule the physician's time during your ultrasound.            Aug 02, 2018 11:45 AM CDT   M US COMPRE SINGLE F/U with URMFMUSR1   [facility] Maternal Fetal Medicine Ultrasound - [facility])    [address]  [facility] [id number] [phone]           Wear comfortable clothes and leave your valuables at home.            Aug 02, 2018 12:15 PM CDT   Radiology MD with JULIANA VALERIO MD   [facility] Maternal Fetal Medicine - [place] ([facility])    [address]  [facility] [address] [phone]           Please arrive at the time given for your first appointment. This visit is used internally to schedule the physician's time during your ultrasound.            Nov 12, 2018 10:00 AM CST   (Arrive by 9:45 AM)   RETURN DIABETES with Sharri Langford MD   [facility] Endocrinology ([facility])    [address]  [address] [facility] [address]  "  [phone]              Future tests that were ordered for you today     Open Future Orders        Priority Expected Expires Ordered    TSH with free T4 reflex Routine 2018            Who to contact     Please call your clinic at [phone] to:    Ask questions about your health    Make or cancel appointments    Discuss your medicines    Learn about your test results    Speak to your doctor            Additional Information About Your Visit        [employer] Information     [employer] is an electronic gateway that provides easy, online access to your medical records. With [employer], you can request a clinic appointment, read your test results, renew a prescription or communicate with your care team.     To sign up for [employer] visit the website at [email or web address]   You will be asked to enter the access code listed below, as well as some personal information. Please follow the directions to create your username and password.     Your access code is: [email or web address]  Expires: 2018  6:31 AM     Your access code will  in 90 days. If you need help or a new code, please contact your [facility] or call [phone] for assistance.        Care EveryWhere ID     This is your Care EveryWhere ID. This could be used by other organizations to access your [place] medical records  [id number]        Your Vitals Were     Pulse Height Last Period BMI (Body Mass Index)          116 1.753 m (5' 9.02\") 11/15/2017 39.43 kg/m2         Blood Pressure from Last 3 Encounters:   18 114/79   18 110/74    Weight from Last 3 Encounters:   18 121.2 kg (267 lb 1.6 oz)   18 119.9 kg (264 lb 6.4 oz)              We Performed the Following     Hemoglobin A1c POCT          Today's Medication Changes          These changes are accurate as of 18  3:16 PM.  If you have any questions, ask your nurse or doctor.               These medicines have changed " or have updated prescriptions.        Dose/Directions    insulin  UNIT/ML injection   Commonly known as:  HumuLIN N/NovoLIN N   This may have changed:    - how much to take  - how to take this  - when to take this  - additional instructions   Changed by:  Sharri Langford MD        Administer 7 unit(s) in the morning (9 AM) and 10 U at bedtime (9 PM)   Quantity:  21 mL   Refills:  3            Where to get your medicines      These medications were sent to [place] [address] [facility] - [place], [place] - [address]  [address] [facility] [address]     Phone:  [phone]     insulin  UNIT/ML injection                Primary Care Provider Office Phone # Fax #    Clinic [facility] [phone]       [address] [facility] [address]        Equal Access to Services     CARLENE BENÍTEZ : Troy ren Soterry, waaxda luqadaha, qaybta kaalmada adeegyada, mis fisher . So [facility] [phone].    ATENCIÓN: Si habla español, tiene a hu disposición servicios gratuitos de asistencia lingüística. Llame al [phone].    We comply with applicable federal civil rights laws and [place] laws. We do not discriminate on the basis of race, color, national origin, age, disability, sex, sexual orientation, or gender identity.            Thank you!     Thank you for choosing [facility] ENDOCRINOLOGY  for your care. Our goal is always to provide you with excellent care. Hearing back from our patients is one way we can continue to improve our services. Please take a few minutes to complete the written survey that you may receive in the mail after your visit with us. Thank you!             Your Updated Medication List - Protect others around you: Learn how to safely use, store and throw away your medicines at www.disposemymeds.org.          This list is accurate as of 7/30/18  3:16 PM.  Always use your most recent med list.                   Brand Name  Dispense Instructions for use Diagnosis    insulin  UNIT/ML injection    HumuLIN N/NovoLIN N    21 mL    Administer 7 unit(s) in the morning (9 AM) and 10 U at bedtime (9 PM)        prenatal multivitamin plus iron 27-0.8 MG Tabs per tablet      Take 1 tablet by mouth

## 2018-08-02 ENCOUNTER — HOSPITAL ENCOUNTER (OUTPATIENT)
Dept: ULTRASOUND IMAGING | Facility: CLINIC | Age: 38
Discharge: HOME | End: 2018-08-02
Attending: OBSTETRICS & GYNECOLOGY | Admitting: OBSTETRICS & GYNECOLOGY
Payer: COMMERCIAL

## 2018-08-02 ENCOUNTER — OFFICE VISIT (OUTPATIENT)
Dept: MATERNAL FETAL MEDICINE | Facility: CLINIC | Age: 38
End: 2018-08-02
Attending: OBSTETRICS & GYNECOLOGY
Payer: COMMERCIAL

## 2018-08-02 DIAGNOSIS — O24.414 INSULIN CONTROLLED GESTATIONAL DIABETES MELLITUS (GDM) DURING PREGNANCY, ANTEPARTUM: Primary | ICD-10-CM

## 2018-08-02 PROCEDURE — 76816 OB US FOLLOW-UP PER FETUS: CPT

## 2018-08-02 PROCEDURE — 76819 FETAL BIOPHYS PROFIL W/O NST: CPT | Performed by: OBSTETRICS & GYNECOLOGY

## 2018-08-02 NOTE — MR AVS SNAPSHOT
After Visit Summary   8/2/2018    Lucia Lora    MRN: [id number]           Patient Information     Date Of Birth          1980        Visit Information        Provider Department      8/2/2018 12:15 PM [facility] DO [facility] - [place]        Today's Diagnoses     Insulin controlled gestational diabetes mellitus (GDM) during pregnancy, antepartum    -  1       Follow-ups after your visit        Your next 10 appointments already scheduled     Aug 06, 2018  3:00 PM CDT   MFM BPP SINGLE with [id number]   [facility])    [address]  [facility] [address]   [phone]            Aug 06, 2018  3:30 PM CDT   Radiology MD with JULIANA VALERIO MD   [facility])    [address]  [facility] [address]   [phone]           Please arrive at the time given for your first appointment. This visit is used internally to schedule the physician's time during your ultrasound.            Aug 09, 2018  3:30 PM CDT   MFM BPP SINGLE with URMFMUSR3   [facility] - [place] ([facility])    [address]  [facility] [address]   [phone]            Aug 09, 2018  4:00 PM CDT   Radiology MD with JULIANA VALERIO MD   [facility] [place] ([facility])    [address]  [facility] [address]   [phone]           Please arrive at the time given for your first appointment. This visit is used internally to schedule the physician's time during your ultrasound.            Aug 13, 2018  2:15 PM CDT   MFM BPP SINGLE with URMFMUSR1   [facility] - [place] ([facility])    [address]  [facility] [address]   [phone]            Aug 13, 2018  2:45 PM CDT   Radiology MD with JULIANA VALERIO MD   [facility] Maternal Fetal Medicine - [facility])    [address]  [facility] [address]   [phone]           Please arrive at the time given for your first appointment. This visit is used internally to schedule the physician's time during your ultrasound.            Aug 16, 2018 11:45 AM CDT   M BPP SINGLE with URMFMUSR3   [facility] Maternal Fetal Medicine Ultrasound - [place] ([facility])    [address]  [facility] [address]   [phone]            Aug 16, 2018 12:15 PM CDT   Radiology MD with JULIANA VALERIO MD   [facility] Maternal Fetal Medicine - [place] ([facility])    [address]  [facility] [address]   [phone]           Please arrive at the time given for your first appointment. This visit is used internally to schedule the physician's time during your ultrasound.            Nov 12, 2018 10:00 AM CST   (Arrive by 9:45 AM)   RETURN DIABETES with Sharri Langford MD   [facility] Endocrinology ([facility])    [address] [place] [id number] [phone]              Future tests that were ordered for you today     Open Future Orders        Priority Expected Expires Ordered    MFM BPP Single Routine 8/6/2018 6/2/2019 8/2/2018    MFM BPP Single Routine 8/9/2018 6/2/2019 8/2/2018    MFM BPP Single Routine 8/13/2018 6/2/2019 8/2/2018    MFM BPP Single Routine 8/16/2018 6/2/2019 8/2/2018            Who to contact     If you have questions or need follow up information about today's clinic visit or your schedule please contact [facility] MATERNAL FETAL MEDICINE [facility] directly at [phone].  Normal or non-critical lab and imaging results will be communicated to you by MyChart, letter or phone  within 4 business days after the clinic has received the results. If you do not hear from us within 7 days, please contact the clinic through [employer] or phone. If you have a critical or abnormal lab result, we will notify you by phone as soon as possible.  Submit refill requests through [employer] or call your pharmacy and they will forward the refill request to us. Please allow 3 business days for your refill to be completed.          Additional Information About Your Visit        Care EveryWhere ID     This is your Care EveryWhere ID. This could be used by other organizations to access your [place] medical records  [id number]        Your Vitals Were     Last Period                   11/15/2017            Blood Pressure from Last 3 Encounters:   07/30/18 114/79   07/02/18 110/74    Weight from Last 3 Encounters:   07/30/18 121.2 kg (267 lb 1.6 oz)   07/02/18 119.9 kg (264 lb 6.4 oz)               Primary Care Provider Office Phone # Fax #    Clinic [facility] [phone]       [address] [facility] [address]        Equal Access to Services     CARLENE [facility] : Hadii aad ku hadasho Soomaali, waaxda luqadaha, qaybta kaalmada adeegyada, waxay maren hayneli fisher . So [facility] [phone].    ATENCIÓN: Si habla español, tiene a hu disposición servicios gratuitos de asistencia lingüística. Jg al [phone].    We comply with applicable federal civil rights laws and [place] laws. We do not discriminate on the basis of race, color, national origin, age, disability, sex, sexual orientation, or gender identity.            Thank you!     Thank you for choosing [facility]  for your care. Our goal is always to provide you with excellent care. Hearing back from our patients is one way we can continue to improve our services. Please take a few minutes to complete the written survey that you may receive in the mail after your visit with us. Thank you!              Your Updated Medication List - Protect others around you: Learn how to safely use, store and throw away your medicines at www.disposemymeds.org.          This list is accurate as of 8/2/18  5:07 PM.  Always use your most recent med list.                   Brand Name Dispense Instructions for use Diagnosis    insulin  UNIT/ML injection    HumuLIN N/NovoLIN N    21 mL    Administer 7 unit(s) in the morning (9 AM) and 10 U at bedtime (9 PM)        prenatal multivitamin plus iron 27-0.8 MG Tabs per tablet      Take 1 tablet by mouth

## 2018-08-06 ENCOUNTER — HOSPITAL ENCOUNTER (OUTPATIENT)
Dept: ULTRASOUND IMAGING | Facility: CLINIC | Age: 38
Discharge: HOME | End: 2018-08-06
Attending: OBSTETRICS & GYNECOLOGY | Admitting: OBSTETRICS & GYNECOLOGY
Payer: COMMERCIAL

## 2018-08-06 ENCOUNTER — OFFICE VISIT (OUTPATIENT)
Dept: MATERNAL FETAL MEDICINE | Facility: CLINIC | Age: 38
End: 2018-08-06
Attending: OBSTETRICS & GYNECOLOGY
Payer: COMMERCIAL

## 2018-08-06 DIAGNOSIS — O24.414 INSULIN CONTROLLED GESTATIONAL DIABETES MELLITUS (GDM) DURING PREGNANCY, ANTEPARTUM: Primary | ICD-10-CM

## 2018-08-06 DIAGNOSIS — O24.414 INSULIN CONTROLLED GESTATIONAL DIABETES MELLITUS (GDM) DURING PREGNANCY, ANTEPARTUM: ICD-10-CM

## 2018-08-06 PROCEDURE — 76819 FETAL BIOPHYS PROFIL W/O NST: CPT

## 2018-08-06 NOTE — MR AVS SNAPSHOT
After Visit Summary   8/6/2018    Lucia Lora    MRN: [id number]           Patient Information     Date Of Birth          1980        Visit Information        Provider Department      8/6/2018 3:30 PM [facility] DO [facility] [place]        Today's Diagnoses     Insulin controlled gestational diabetes mellitus (GDM) during pregnancy, antepartum    -  1       Follow-ups after your visit        Your next 10 appointments already scheduled     Aug 09, 2018  3:30 PM CDT   MFM BPP SINGLE with [email or web address]   [facility])    [address]  [facility] [address]   [phone]            Aug 09, 2018  4:00 PM CDT   Radiology MD with JULIANA VALERIO MD   [facility])    [address]  [facility] [address]   [phone]           Please arrive at the time given for your first appointment. This visit is used internally to schedule the physician's time during your ultrasound.            Aug 13, 2018  2:15 PM CDT   MFM BPP SINGLE with URMFMUSR1   [facility] - [place] ([facility])    [address]  [facility] [address]   [phone]            Aug 13, 2018  2:45 PM CDT   Radiology MD with JULIANA VALERIO MD   [facility] [place] ([facility])    [address]  [facility] [address]   [phone]           Please arrive at the time given for your first appointment. This visit is used internally to schedule the physician's time during your ultrasound.            Aug 16, 2018 11:45 AM CDT   MFM BPP SINGLE with [email or web address]   [facility] - [place] ([facility])    [address]  [facility] [address]   [phone]            Aug 16, 2018 12:15 PM CDT   Radiology MD with UR IMAN MONTILLA   [facility] Maternal Fetal Medicine [facility] ([facility])    [address]  [facility] [address]   [phone]           Please arrive at the time given for your first appointment. This visit is used internally to schedule the physician's time during your ultrasound.            Nov 12, 2018 10:00 AM CST   (Arrive by 9:45 AM)   RETURN DIABETES with Sharri Langford MD   [facility] Endocrinology ([facility])    [address]  3rd Floor  [facility] [id number] [phone]              Who to contact     If you have questions or need follow up information about today's clinic visit or your schedule please contact [facility] MATERNAL FETAL MEDICINE [facility] directly at [phone].  Normal or non-critical lab and imaging results will be communicated to you by MyChart, letter or phone within 4 business days after the clinic has received the results. If you do not hear from us within 7 days, please contact the clinic through MyChart or phone. If you have a critical or abnormal lab result, we will notify you by phone as soon as possible.  Submit refill requests through [employer] or call your pharmacy and they will forward the refill request to us. Please allow 3 business days for your refill to be completed.          Additional Information About Your Visit        Care EveryWhere ID     This is your Care EveryWhere ID. This could be used by other organizations to access your [place] medical records  [id number]        Your Vitals Were     Last Period                   11/15/2017            Blood Pressure from Last 3 Encounters:   07/30/18 114/79   07/02/18 110/74    Weight from Last 3 Encounters:   07/30/18 121.2 kg (267 lb 1.6 oz)   07/02/18 119.9 kg (264 lb 6.4 oz)              Today, you had the following     No orders found  for display       Primary Care Provider Office Phone # Fax #    [facility] [phone]       [address] [facility] [address]        Equal Access to Services     CARLENE BENÍTEZ : Hadii aad ku hadcharancarine Fredis, gamalielda luqpenny, garyta kaluxda clint, mis kapadia laJalilneli wilks. So [place] -[phone].    ATENCIÓN: Si habla español, tiene a hu disposición servicios gratuitos de asistencia lingüística. Llame al [phone].    We comply with applicable federal civil rights laws and [place] laws. We do not discriminate on the basis of race, color, national origin, age, disability, sex, sexual orientation, or gender identity.            Thank you!     Thank you for choosing [facility]  for your care. Our goal is always to provide you with excellent care. Hearing back from our patients is one way we can continue to improve our services. Please take a few minutes to complete the written survey that you may receive in the mail after your visit with us. Thank you!             Your Updated Medication List - Protect others around you: Learn how to safely use, store and throw away your medicines at www.disposemymeds.org.          This list is accurate as of 8/6/18  4:49 PM.  Always use your most recent med list.                   Brand Name Dispense Instructions for use Diagnosis    insulin  UNIT/ML injection    HumuLIN N/NovoLIN N    21 mL    Administer 7 unit(s) in the morning (9 AM) and 10 U at bedtime (9 PM)        prenatal multivitamin plus iron 27-0.8 MG Tabs per tablet      Take 1 tablet by mouth

## 2018-08-09 ENCOUNTER — HOSPITAL ENCOUNTER (OUTPATIENT)
Dept: ULTRASOUND IMAGING | Facility: CLINIC | Age: 38
Discharge: HOME | End: 2018-08-09
Attending: OBSTETRICS & GYNECOLOGY | Admitting: OBSTETRICS & GYNECOLOGY
Payer: COMMERCIAL

## 2018-08-09 ENCOUNTER — OFFICE VISIT (OUTPATIENT)
Dept: MATERNAL FETAL MEDICINE | Facility: CLINIC | Age: 38
End: 2018-08-09
Attending: OBSTETRICS & GYNECOLOGY
Payer: COMMERCIAL

## 2018-08-09 DIAGNOSIS — O24.414 INSULIN CONTROLLED GESTATIONAL DIABETES MELLITUS (GDM) DURING PREGNANCY, ANTEPARTUM: Primary | ICD-10-CM

## 2018-08-09 DIAGNOSIS — O24.414 INSULIN CONTROLLED GESTATIONAL DIABETES MELLITUS (GDM) DURING PREGNANCY, ANTEPARTUM: ICD-10-CM

## 2018-08-09 PROCEDURE — 76819 FETAL BIOPHYS PROFIL W/O NST: CPT

## 2018-08-09 NOTE — PROGRESS NOTES
Please see full imaging report from View[employer] program under imaging tab.    BPP 8/8    Teodoro Izaguirre MD  Maternal Fetal Medicine

## 2018-08-09 NOTE — MR AVS SNAPSHOT
After Visit Summary   8/9/2018    Lucia Lora    MRN: [id number]           Patient Information     Date Of Birth          1980        Visit Information        Provider Department      8/9/2018 4:00 PM Teodoro Izaguirre MD [facility] Maternal Fetal Medicine - [place]        Today's Diagnoses     Insulin controlled gestational diabetes mellitus (GDM) during pregnancy, antepartum    -  1       Follow-ups after your visit        Your next 10 appointments already scheduled     Aug 09, 2018  4:00 PM CDT   Radiology MD with Teodoro Izaguirre MD   [facility] - [facility])    [address]  [facility] [address]   [phone]           Please arrive at the time given for your first appointment. This visit is used internally to schedule the physician's time during your ultrasound.            Aug 13, 2018  2:15 PM CDT   MFDAVID BPANABEL SINGLE with URMFMUSR1   [facility] Ultrasound - [facility])    [address]  [facility] [address]   [phone]            Aug 13, 2018  2:45 PM CDT   Radiology MD with JULIANA VALERIO MD   [facility] - [place] ([facility])    [address]  [facility] [address]   [phone]           Please arrive at the time given for your first appointment. This visit is used internally to schedule the physician's time during your ultrasound.            Aug 16, 2018 11:45 AM CDT   IMAN CADENA SINGLE with URMFMUSR4   [facility] - [place] ([facility])    [address]  [facility] [address]   [phone]            Aug 16, 2018 12:15 PM CDT   Radiology MD with JULIANA VALERIO MD   [facility] - [place] ([facility])    [address]  [facility]  [address]   [phone]           Please arrive at the time given for your first appointment. This visit is used internally to schedule the physician's time during your ultrasound.            Nov 12, 2018 10:00 AM CST   (Arrive by 9:45 AM)   RETURN DIABETES with Sharri Langford MD   [facility] Endocrinology ([facility] [place])    [address]  [address] [facility] [address] [phone]              Who to contact     If you have questions or need follow up information about today's clinic visit or your schedule please contact [facility] MATERNAL FETAL MEDICINE [facility] directly at [phone].  Normal or non-critical lab and imaging results will be communicated to you by MyChart, letter or phone within 4 business days after the clinic has received the results. If you do not hear from us within 7 days, please contact the clinic through MyChart or phone. If you have a critical or abnormal lab result, we will notify you by phone as soon as possible.  Submit refill requests through [employer] or call your pharmacy and they will forward the refill request to us. Please allow 3 business days for your refill to be completed.          Additional Information About Your Visit        Care EveryWhere ID     This is your Care EveryWhere ID. This could be used by other organizations to access your [place] medical records  [id number]        Your Vitals Were     Last Period                   11/15/2017            Blood Pressure from Last 3 Encounters:   07/30/18 114/79   07/02/18 110/74    Weight from Last 3 Encounters:   07/30/18 121.2 kg (267 lb 1.6 oz)   07/02/18 119.9 kg (264 lb 6.4 oz)              Today, you had the following     No orders found for display       Primary Care Provider Office Phone # Fax #    Clinic [facility] [phone]       [address] [facility] [address]        Equal Access to Services     CARLENE BENÍTEZ AH: zulma Moore  madhu seymourmakiran brownbarryjudith beckkwasi nataliyajose miguel wilks. So [facility] [phone].    ATENCIÓN: Si radnell san, tiene a hu disposición servicios gratuitos de asistencia lingüística. Llame al [phone].    We comply with applicable federal civil rights laws and [place] laws. We do not discriminate on the basis of race, color, national origin, age, disability, sex, sexual orientation, or gender identity.            Thank you!     Thank you for choosing [facility]  for your care. Our goal is always to provide you with excellent care. Hearing back from our patients is one way we can continue to improve our services. Please take a few minutes to complete the written survey that you may receive in the mail after your visit with us. Thank you!             Your Updated Medication List - Protect others around you: Learn how to safely use, store and throw away your medicines at www.disposemymeds.org.          This list is accurate as of 8/9/18  3:34 PM.  Always use your most recent med list.                   Brand Name Dispense Instructions for use Diagnosis    insulin  UNIT/ML injection    HumuLIN N/NovoLIN N    21 mL    Administer 7 unit(s) in the morning (9 AM) and 10 U at bedtime (9 PM)        prenatal multivitamin plus iron 27-0.8 MG Tabs per tablet      Take 1 tablet by mouth

## 2018-08-13 ENCOUNTER — HOSPITAL ENCOUNTER (OUTPATIENT)
Facility: CLINIC | Age: 38
Discharge: HOME | End: 2018-08-13
Attending: ADVANCED PRACTICE MIDWIFE | Admitting: ADVANCED PRACTICE MIDWIFE
Payer: COMMERCIAL

## 2018-08-13 ENCOUNTER — HOSPITAL ENCOUNTER (OUTPATIENT)
Dept: ULTRASOUND IMAGING | Facility: CLINIC | Age: 38
Discharge: HOME | End: 2018-08-13
Attending: OBSTETRICS & GYNECOLOGY | Admitting: OBSTETRICS & GYNECOLOGY
Payer: COMMERCIAL

## 2018-08-13 ENCOUNTER — OFFICE VISIT (OUTPATIENT)
Dept: MATERNAL FETAL MEDICINE | Facility: CLINIC | Age: 38
End: 2018-08-13
Attending: OBSTETRICS & GYNECOLOGY
Payer: COMMERCIAL

## 2018-08-13 ENCOUNTER — HOSPITAL ENCOUNTER (OUTPATIENT)
Facility: CLINIC | Age: 38
End: 2018-08-13
Admitting: ADVANCED PRACTICE MIDWIFE
Payer: COMMERCIAL

## 2018-08-13 VITALS
WEIGHT: 270 LBS | HEIGHT: 69 IN | DIASTOLIC BLOOD PRESSURE: 77 MMHG | SYSTOLIC BLOOD PRESSURE: 133 MMHG | BODY MASS INDEX: 39.99 KG/M2 | TEMPERATURE: 98.3 F

## 2018-08-13 DIAGNOSIS — O24.414 INSULIN CONTROLLED GESTATIONAL DIABETES MELLITUS (GDM) DURING PREGNANCY, ANTEPARTUM: ICD-10-CM

## 2018-08-13 DIAGNOSIS — O24.414 INSULIN CONTROLLED GESTATIONAL DIABETES MELLITUS (GDM) DURING PREGNANCY, ANTEPARTUM: Primary | ICD-10-CM

## 2018-08-13 PROBLEM — O42.90 AMNIOTIC FLUID LEAKING: Status: ACTIVE | Noted: 2018-08-13

## 2018-08-13 PROBLEM — D69.6 BENIGN GESTATIONAL THROMBOCYTOPENIA IN THIRD TRIMESTER (H): Status: ACTIVE | Noted: 2018-08-13

## 2018-08-13 PROBLEM — O99.113 BENIGN GESTATIONAL THROMBOCYTOPENIA IN THIRD TRIMESTER (H): Status: ACTIVE | Noted: 2018-08-13

## 2018-08-13 LAB
ALT SERPL W P-5'-P-CCNC: 15 U/L (ref 0–50)
ANION GAP SERPL CALCULATED.3IONS-SCNC: 9 MMOL/L (ref 3–14)
AST SERPL W P-5'-P-CCNC: 22 U/L (ref 0–45)
BUN SERPL-MCNC: 7 MG/DL (ref 7–30)
CALCIUM SERPL-MCNC: 8.6 MG/DL (ref 8.5–10.1)
CHLORIDE SERPL-SCNC: 109 MMOL/L (ref 94–109)
CO2 SERPL-SCNC: 20 MMOL/L (ref 20–32)
CREAT SERPL-MCNC: 0.54 MG/DL (ref 0.52–1.04)
CREAT UR-MCNC: 135 MG/DL
ERYTHROCYTE [DISTWIDTH] IN BLOOD BY AUTOMATED COUNT: 19.5 % (ref 10–15)
GFR SERPL CREATININE-BSD FRML MDRD: >90 ML/MIN/1.7M2
GLUCOSE BLDC GLUCOMTR-MCNC: 83 MG/DL (ref 70–99)
GLUCOSE SERPL-MCNC: 73 MG/DL (ref 70–99)
HCT VFR BLD AUTO: 33.3 % (ref 35–47)
HGB BLD-MCNC: 10.3 G/DL (ref 11.7–15.7)
MCH RBC QN AUTO: 24.1 PG (ref 26.5–33)
MCHC RBC AUTO-ENTMCNC: 30.9 G/DL (ref 31.5–36.5)
MCV RBC AUTO: 78 FL (ref 78–100)
PLATELET # BLD AUTO: 112 10E9/L (ref 150–450)
POTASSIUM SERPL-SCNC: 3.8 MMOL/L (ref 3.4–5.3)
PROT UR-MCNC: 0.16 G/L
PROT/CREAT 24H UR: 0.12 G/G CR (ref 0–0.2)
RBC # BLD AUTO: 4.28 10E12/L (ref 3.8–5.2)
RUPTURE OF FETAL MEMBRANES BY ROM PLUS: NEGATIVE
SODIUM SERPL-SCNC: 138 MMOL/L (ref 133–144)
URATE SERPL-MCNC: 4.3 MG/DL (ref 2.6–6)
WBC # BLD AUTO: 5.6 10E9/L (ref 4–11)

## 2018-08-13 PROCEDURE — 36415 COLL VENOUS BLD VENIPUNCTURE: CPT | Performed by: ADVANCED PRACTICE MIDWIFE

## 2018-08-13 PROCEDURE — 76819 FETAL BIOPHYS PROFIL W/O NST: CPT

## 2018-08-13 PROCEDURE — 80048 BASIC METABOLIC PNL TOTAL CA: CPT | Performed by: ADVANCED PRACTICE MIDWIFE

## 2018-08-13 PROCEDURE — 84550 ASSAY OF BLOOD/URIC ACID: CPT | Performed by: ADVANCED PRACTICE MIDWIFE

## 2018-08-13 PROCEDURE — G0463 HOSPITAL OUTPT CLINIC VISIT: HCPCS | Mod: 25

## 2018-08-13 PROCEDURE — 84450 TRANSFERASE (AST) (SGOT): CPT | Performed by: ADVANCED PRACTICE MIDWIFE

## 2018-08-13 PROCEDURE — 84156 ASSAY OF PROTEIN URINE: CPT | Performed by: ADVANCED PRACTICE MIDWIFE

## 2018-08-13 PROCEDURE — 40000809 ZZH STATISTIC NO DOCUMENTATION TO SUPPORT CHARGE

## 2018-08-13 PROCEDURE — 40000068 ZZH STATISTIC EVAL-PTS ADMITTED TO OTHER DEPTS: Mod: ZF

## 2018-08-13 PROCEDURE — 82962 GLUCOSE BLOOD TEST: CPT

## 2018-08-13 PROCEDURE — G0463 HOSPITAL OUTPT CLINIC VISIT: HCPCS

## 2018-08-13 PROCEDURE — 84112 EVAL AMNIOTIC FLUID PROTEIN: CPT | Performed by: ADVANCED PRACTICE MIDWIFE

## 2018-08-13 PROCEDURE — 59025 FETAL NON-STRESS TEST: CPT

## 2018-08-13 PROCEDURE — 84460 ALANINE AMINO (ALT) (SGPT): CPT | Performed by: ADVANCED PRACTICE MIDWIFE

## 2018-08-13 PROCEDURE — 85027 COMPLETE CBC AUTOMATED: CPT | Performed by: ADVANCED PRACTICE MIDWIFE

## 2018-08-13 RX ORDER — ONDANSETRON 2 MG/ML
4 INJECTION INTRAMUSCULAR; INTRAVENOUS EVERY 6 HOURS PRN
Status: DISCONTINUED | OUTPATIENT
Start: 2018-08-13 | End: 2018-08-13 | Stop reason: HOSPADM

## 2018-08-13 NOTE — MR AVS SNAPSHOT
After Visit Summary   8/13/2018    Lucia Lora    MRN: [id number]           Patient Information     Date Of Birth          1980        Visit Information        Provider Department      8/13/2018 2:45 PM Chan Rojas MD [facility] Maternal Fetal Medicine [facility]        Today's Diagnoses     Insulin controlled gestational diabetes mellitus (GDM) during pregnancy, antepartum    -  1       Follow-ups after your visit        Your next 10 appointments already scheduled     Aug 16, 2018 11:45 AM CDT   IMAN BPP SINGLE with [id number]   [facility] Maternal Fetal Medicine Ultrasound - [facility])    [address]  [facility] [address] [phone]            Aug 16, 2018 12:15 PM CDT   Radiology MD with UR IMAN MONTILLA   [facility] Maternal Fetal Medicine - [facility])    [address]  [facility] [address]   [phone]           Please arrive at the time given for your first appointment. This visit is used internally to schedule the physician's time during your ultrasound.            Nov 12, 2018 10:00 AM CST   (Arrive by 9:45 AM)   RETURN DIABETES with Sharri Langford MD   [facility] Endocrinology ([facility])    [address] [place] [address] [phone]              Who to contact     If you have questions or need follow up information about today's clinic visit or your schedule please contact [facility] MATERNAL FETAL MEDICINE [facility] directly at [phone].  Normal or non-critical lab and imaging results will be communicated to you by MyChart, letter or phone within 4 business days after the clinic has received the results. If you do not hear from us within 7 days, please contact the clinic through MyChart or phone. If you have a critical or abnormal lab result, we will notify you by phone as soon as possible.  Submit  refill requests through [employer] or call your pharmacy and they will forward the refill request to us. Please allow 3 business days for your refill to be completed.          Additional Information About Your Visit        Care EveryWhere ID     This is your Care EveryWhere ID. This could be used by other organizations to access your [place] medical records  [id number]        Your Vitals Were     Last Period                   11/15/2017            Blood Pressure from Last 3 Encounters:   07/30/18 114/79   07/02/18 110/74    Weight from Last 3 Encounters:   07/30/18 121.2 kg (267 lb 1.6 oz)   07/02/18 119.9 kg (264 lb 6.4 oz)              Today, you had the following     No orders found for display       Primary Care Provider Office Phone # Fax #    Clinic [facility] [phone]       [address] [facility] [address]        Equal Access to Services     CARLENE BENÍTEZ : Hadii jayde ren Soterry, waaxda luqadaha, qaybta kaalmada ademeganyada, mis fisher . So [facility] [phone].    ATENCIÓN: Si habla español, tiene a hu disposición servicios gratuitos de asistencia lingüística. Jg al [phone].    We comply with applicable federal civil rights laws and [place] laws. We do not discriminate on the basis of race, color, national origin, age, disability, sex, sexual orientation, or gender identity.            Thank you!     Thank you for choosing [facility]  for your care. Our goal is always to provide you with excellent care. Hearing back from our patients is one way we can continue to improve our services. Please take a few minutes to complete the written survey that you may receive in the mail after your visit with us. Thank you!             Your Updated Medication List - Protect others around you: Learn how to safely use, store and throw away your medicines at www.disposemymeds.org.          This list is accurate as of 8/13/18   2:52 PM.  Always use your most recent med list.                   Brand Name Dispense Instructions for use Diagnosis    insulin  UNIT/ML injection    HumuLIN N/NovoLIN N    21 mL    Administer 7 unit(s) in the morning (9 AM) and 10 U at bedtime (9 PM)        prenatal multivitamin plus iron 27-0.8 MG Tabs per tablet      Take 1 tablet by mouth

## 2018-08-13 NOTE — IP AVS SNAPSHOT
[facility]    [address]    [facility] [address]    Phone:  [phone]                                       After Visit Summary   8/13/2018    Lucia Lora    MRN: [id number]           After Visit Summary Signature Page     I have received my discharge instructions, and my questions have been answered. I have discussed any challenges I see with this plan with the nurse or doctor.    ..........................................................................................................................................  Patient/Patient Representative Signature      ..........................................................................................................................................  Patient Representative Print Name and Relationship to Patient    ..................................................               ................................................  Date                                            Time    ..........................................................................................................................................  Reviewed by Signature/Title    ...................................................              ..............................................  Date                                                            Time

## 2018-08-13 NOTE — PROGRESS NOTES
"[facility] Ultrasound    BPP 8/8. HAWK low at 5.5 cm    The patient was [facility] to the [facility] to assess for ROM and for monitoring for fetal tachycardia. A BPP is recommended in 24 hours if there id not ROM due to low HAWK.  Return to primary provider for continued prenatal care.    Please see \"Imaging\" tab under \"Chart Review\" for details of today's US at the [facility].    Chan Rojas MD  Maternal-Fetal Medicine      "

## 2018-08-13 NOTE — IP AVS SNAPSHOT
MRN:[id number]                      After Visit Summary   8/13/2018    Lucia Lora    MRN: [id number]           Thank you!     Thank you for choosing [place] for your care. Our goal is always to provide you with excellent care. Hearing back from our patients is one way we can continue to improve our services. Please take a few minutes to complete the written survey that you may receive in the mail after you visit with us. Thank you!        Patient Information     Date Of Birth          1980        Designated Caregiver       Most Recent Value    Caregiver    Will someone help with your care after discharge? yes      About your hospital stay     You were admitted on:  August 13, 2018 You last received care in the:  [facility]    You were discharged on:  August 13, 2018       Who to Call     For medical emergencies, please call 911.  For non-urgent questions about your medical care, please call your primary care provider or clinic, [phone]          Attending Provider     Provider Specialty    Kriss Love APRN CNM Midwives    Yessenia Palacios APRN CNM OB/Gyn       Primary Care Provider Office Phone # Fax #    [facility] [phone]      Your next 10 appointments already scheduled     Aug 16, 2018 11:45 AM CDT   IMAN CADENA SINGLE with [id number]   [facility] Maternal [facility]etal [facility] Ul[facility]ound - [facility])    [address]  [facility] [address]   [phone]            Aug 16, 2018 12:15 PM CDT   Radiology MD with UR IMAN MONTILLA   [facility] M[facility]etal [facility] - [facility])    [address]  [facility] [address]   [phone]           Please arrive at the time given for your first appointment. This visit is used internally to schedule the physician's time during your ultrasound.            Nov 12, 2018 10:00 AM CST   (Arrive by 9:45 AM)   RETURN  DIABETES with Sharri Langford MD   [facility] Endocrinology ([facility])    [address] [facility]  [address] [facility] [address] [phone]              Further instructions from your care team       Discharge Instruction for Undelivered Patients      You were seen for: Fetal Assessment  We Consulted: Kriss Love CNM  You had (Test or Medicine):Diet:   Drink 8 to 12 glasses of liquids (milk, juice, water) every day.  You may eat meals and snacks.     Activity:  Count fetal kicks everyday (see handout)  Call your doctor or nurse midwife if your baby is moving less than usual.     Call your provider if you notice:  Swelling in your face or increased swelling in your hands or legs.  Headaches that are not relieved by Tylenol (acetaminophen).  Changes in your vision (blurring: seeing spots or stars.)  Nausea (sick to your stomach) and vomiting (throwing up).   Weight gain of 5 pounds or more per week.  Heartburn that doesn't go away.  Signs of bladder infection: pain when you urinate (use the toilet), need to go more often and more urgently.  The bag of oglesby (rupture of membranes) breaks, or you notice leaking in your underwear.  Bright red blood in your underwear.  Abdominal (lower belly) or stomach pain.  For first baby: Contractions (tightening) less than 5 minutes apart for one hour or more.  Second (plus) baby: Contractions (tightening) less than 10 minutes apart and getting stronger.  *If less than 34 weeks: Contractions (tightenings) more than 6 times in one hour.  Increase or change in vaginal discharge (note the color and amount)  Other:     Follow-up:  M will call patient tomorrow to come at the clinic foR BPP          Pending Results     No orders found from 8/11/2018 to 8/14/2018.            Admission Information     Date & Time Provider Department Dept. Phone    8/13/2018 Yessenia Palacios APRN CNM [facility] [phone]      Your Vitals Were     Blood  "Pressure Temperature Height Weight Last Period BMI (Body Mass Index)    133/77 98.3  F (36.8  C) (Oral) 1.753 m (5' 9\") 122.5 kg (270 lb) 11/15/2017 39.87 kg/m2      Care EveryWhere ID     This is your Care EveryWhere ID. This could be used by other organizations to access your [place] medical records  [id number]        Equal Access to Services     CARLENE BENÍTEZ : Hadii jayde petersono Sobenitoali, waaxda luqadaha, qaybta kaalmada ademeganbarryda, mis engel latashadian brown christianochyna fisher . So [facility] [phone].    ATENCIÓN: Si randell san, tiene a hu disposición servicios gratuitos de asistencia lingüística. Llame al [phone].    We comply with applicable federal civil rights laws and [place] laws. We do not discriminate on the basis of race, color, national origin, age, disability, sex, sexual orientation, or gender identity.               Review of your medicines      UNREVIEWED medicines. Ask your doctor about these medicines        Dose / Directions    insulin  UNIT/ML injection   Commonly known as:  HumuLIN N/NovoLIN N        Administer 7 unit(s) in the morning (9 AM) and 10 U at bedtime (9 PM)   Quantity:  21 mL   Refills:  3       prenatal multivitamin plus iron 27-0.8 MG Tabs per tablet        Dose:  1 tablet   Take 1 tablet by mouth   Refills:  0                Protect others around you: Learn how to safely use, store and throw away your medicines at www.disposemymeds.org.             Medication List: This is a list of all your medications and when to take them. Check marks below indicate your daily home schedule. Keep this list as a reference.      Medications           Morning Afternoon Evening Bedtime As Needed    insulin  UNIT/ML injection   Commonly known as:  HumuLIN N/NovoLIN N   Administer 7 unit(s) in the morning (9 AM) and 10 U at bedtime (9 PM)                                prenatal multivitamin plus iron 27-0.8 MG Tabs per tablet   Take 1 tablet by mouth                          "

## 2018-08-13 NOTE — NURSING NOTE
[facility] to be evaluated for possible rupture of membrane, HAWK low on ultrasound. States she noticed wetness this morning. FHR frequently over 160 during the ultrasound. Report given to charge MINNA Myers and the  escorted by staff to the [facility]. Viviana Royal RN.

## 2018-08-13 NOTE — PROGRESS NOTES
Sent from [facility] for possible ROM - HAWK from 21 to 5.5 and pt states underwear wet.  's per MFM.  BP originally elevated in mild range, additional BPs WNL. Pre E labs WNL.  FHR now 135 bpm baseline, moderate variability.  +Accels No decels. but not yet reactive.   Irregular, mild contractions.  Random BS 80's.   ROM+ negative. Continueous EFM until reactive.  Plan discharge home with f/u in [facility] tomorrow for BPP.   Communicating effectively with in person [facility]  assistance. Kriss UGARTE, RYDER

## 2018-08-14 ENCOUNTER — HOSPITAL ENCOUNTER (OUTPATIENT)
Dept: ULTRASOUND IMAGING | Facility: CLINIC | Age: 38
Discharge: HOME | End: 2018-08-14
Attending: OBSTETRICS & GYNECOLOGY | Admitting: OBSTETRICS & GYNECOLOGY
Payer: COMMERCIAL

## 2018-08-14 ENCOUNTER — HOSPITAL ENCOUNTER (INPATIENT)
Facility: CLINIC | Age: 38
LOS: 2 days | Discharge: HOME HEALTH | End: 2018-08-16
Attending: ADVANCED PRACTICE MIDWIFE | Admitting: ADVANCED PRACTICE MIDWIFE
Payer: COMMERCIAL

## 2018-08-14 ENCOUNTER — OFFICE VISIT (OUTPATIENT)
Dept: MATERNAL FETAL MEDICINE | Facility: CLINIC | Age: 38
End: 2018-08-14
Attending: OBSTETRICS & GYNECOLOGY
Payer: COMMERCIAL

## 2018-08-14 DIAGNOSIS — O24.414 INSULIN CONTROLLED GESTATIONAL DIABETES MELLITUS (GDM) DURING PREGNANCY, ANTEPARTUM: Primary | ICD-10-CM

## 2018-08-14 DIAGNOSIS — O99.810 ABNORMAL MATERNAL GLUCOSE TOLERANCE, ANTEPARTUM: ICD-10-CM

## 2018-08-14 DIAGNOSIS — D64.9 ANEMIA, UNSPECIFIED TYPE: ICD-10-CM

## 2018-08-14 DIAGNOSIS — O28.8 AFI (AMNIOTIC FLUID INDEX) BORDERLINE LOW: ICD-10-CM

## 2018-08-14 DIAGNOSIS — O99.810 ABNORMAL MATERNAL GLUCOSE TOLERANCE, ANTEPARTUM: Primary | ICD-10-CM

## 2018-08-14 PROBLEM — Z36.89 ENCOUNTER FOR TRIAGE IN PREGNANT PATIENT: Status: ACTIVE | Noted: 2018-08-14

## 2018-08-14 LAB
BASOPHILS # BLD AUTO: 0 10E9/L (ref 0–0.2)
BASOPHILS NFR BLD AUTO: 0.2 %
DIFFERENTIAL METHOD BLD: ABNORMAL
EOSINOPHIL # BLD AUTO: 0 10E9/L (ref 0–0.7)
EOSINOPHIL NFR BLD AUTO: 0.6 %
ERYTHROCYTE [DISTWIDTH] IN BLOOD BY AUTOMATED COUNT: 19.1 % (ref 10–15)
HCT VFR BLD AUTO: 34.9 % (ref 35–47)
HGB BLD-MCNC: 11.1 G/DL (ref 11.7–15.7)
IMM GRANULOCYTES # BLD: 0.1 10E9/L (ref 0–0.4)
IMM GRANULOCYTES NFR BLD: 0.8 %
LYMPHOCYTES # BLD AUTO: 2.2 10E9/L (ref 0.8–5.3)
LYMPHOCYTES NFR BLD AUTO: 33.2 %
MCH RBC QN AUTO: 24.4 PG (ref 26.5–33)
MCHC RBC AUTO-ENTMCNC: 31.8 G/DL (ref 31.5–36.5)
MCV RBC AUTO: 77 FL (ref 78–100)
MONOCYTES # BLD AUTO: 0.5 10E9/L (ref 0–1.3)
MONOCYTES NFR BLD AUTO: 7 %
NEUTROPHILS # BLD AUTO: 3.8 10E9/L (ref 1.6–8.3)
NEUTROPHILS NFR BLD AUTO: 58.2 %
NRBC # BLD AUTO: 0 10*3/UL
NRBC BLD AUTO-RTO: 0 /100
PLATELET # BLD AUTO: 110 10E9/L (ref 150–450)
RBC # BLD AUTO: 4.54 10E12/L (ref 3.8–5.2)
WBC # BLD AUTO: 6.6 10E9/L (ref 4–11)

## 2018-08-14 PROCEDURE — 25000128 H RX IP 250 OP 636: Performed by: ADVANCED PRACTICE MIDWIFE

## 2018-08-14 PROCEDURE — 40000068 ZZH STATISTIC EVAL-PTS ADMITTED TO OTHER DEPTS: Mod: ZF

## 2018-08-14 PROCEDURE — 86901 BLOOD TYPING SEROLOGIC RH(D): CPT | Performed by: ADVANCED PRACTICE MIDWIFE

## 2018-08-14 PROCEDURE — 00000146 ZZHCL STATISTIC GLUCOSE BY METER IP

## 2018-08-14 PROCEDURE — 85025 COMPLETE CBC W/AUTO DIFF WBC: CPT | Performed by: ADVANCED PRACTICE MIDWIFE

## 2018-08-14 PROCEDURE — 12000030 ZZH R&B OB INTERMEDIATE UMMC

## 2018-08-14 PROCEDURE — 76819 FETAL BIOPHYS PROFIL W/O NST: CPT

## 2018-08-14 PROCEDURE — 86850 RBC ANTIBODY SCREEN: CPT | Performed by: ADVANCED PRACTICE MIDWIFE

## 2018-08-14 PROCEDURE — 86900 BLOOD TYPING SEROLOGIC ABO: CPT | Performed by: ADVANCED PRACTICE MIDWIFE

## 2018-08-14 PROCEDURE — 86780 TREPONEMA PALLIDUM: CPT | Performed by: ADVANCED PRACTICE MIDWIFE

## 2018-08-14 RX ORDER — OXYTOCIN/0.9 % SODIUM CHLORIDE 30/500 ML
PLASTIC BAG, INJECTION (ML) INTRAVENOUS
Status: DISCONTINUED
Start: 2018-08-14 | End: 2018-08-15 | Stop reason: HOSPADM

## 2018-08-14 RX ORDER — FENTANYL CITRATE 50 UG/ML
50-100 INJECTION, SOLUTION INTRAMUSCULAR; INTRAVENOUS
Status: DISCONTINUED | OUTPATIENT
Start: 2018-08-14 | End: 2018-08-15

## 2018-08-14 RX ORDER — CARBOPROST TROMETHAMINE 250 UG/ML
250 INJECTION, SOLUTION INTRAMUSCULAR
Status: DISCONTINUED | OUTPATIENT
Start: 2018-08-14 | End: 2018-08-15

## 2018-08-14 RX ORDER — SODIUM CHLORIDE 9 MG/ML
INJECTION, SOLUTION INTRAVENOUS CONTINUOUS
Status: DISCONTINUED | OUTPATIENT
Start: 2018-08-14 | End: 2018-08-15

## 2018-08-14 RX ORDER — OXYCODONE AND ACETAMINOPHEN 5; 325 MG/1; MG/1
1 TABLET ORAL
Status: DISCONTINUED | OUTPATIENT
Start: 2018-08-14 | End: 2018-08-15

## 2018-08-14 RX ORDER — LIDOCAINE 40 MG/G
CREAM TOPICAL
Status: DISCONTINUED | OUTPATIENT
Start: 2018-08-14 | End: 2018-08-15

## 2018-08-14 RX ORDER — OXYTOCIN 10 [USP'U]/ML
INJECTION, SOLUTION INTRAMUSCULAR; INTRAVENOUS
Status: DISCONTINUED
Start: 2018-08-14 | End: 2018-08-15 | Stop reason: HOSPADM

## 2018-08-14 RX ORDER — NICOTINE POLACRILEX 4 MG
15-30 LOZENGE BUCCAL
Status: DISCONTINUED | OUTPATIENT
Start: 2018-08-14 | End: 2018-08-15

## 2018-08-14 RX ORDER — METHYLERGONOVINE MALEATE 0.2 MG/ML
200 INJECTION INTRAVENOUS
Status: COMPLETED | OUTPATIENT
Start: 2018-08-14 | End: 2018-08-15

## 2018-08-14 RX ORDER — MISOPROSTOL 200 UG/1
TABLET ORAL
Status: COMPLETED
Start: 2018-08-14 | End: 2018-08-15

## 2018-08-14 RX ORDER — DEXTROSE MONOHYDRATE 25 G/50ML
25-50 INJECTION, SOLUTION INTRAVENOUS
Status: DISCONTINUED | OUTPATIENT
Start: 2018-08-14 | End: 2018-08-15

## 2018-08-14 RX ORDER — ACETAMINOPHEN 325 MG/1
650 TABLET ORAL EVERY 4 HOURS PRN
Status: DISCONTINUED | OUTPATIENT
Start: 2018-08-14 | End: 2018-08-15

## 2018-08-14 RX ORDER — DEXTROSE, SODIUM CHLORIDE, SODIUM LACTATE, POTASSIUM CHLORIDE, AND CALCIUM CHLORIDE 5; .6; .31; .03; .02 G/100ML; G/100ML; G/100ML; G/100ML; G/100ML
INJECTION, SOLUTION INTRAVENOUS CONTINUOUS
Status: DISCONTINUED | OUTPATIENT
Start: 2018-08-14 | End: 2018-08-15

## 2018-08-14 RX ORDER — SODIUM CHLORIDE, SODIUM LACTATE, POTASSIUM CHLORIDE, CALCIUM CHLORIDE 600; 310; 30; 20 MG/100ML; MG/100ML; MG/100ML; MG/100ML
INJECTION, SOLUTION INTRAVENOUS CONTINUOUS
Status: DISCONTINUED | OUTPATIENT
Start: 2018-08-14 | End: 2018-08-15

## 2018-08-14 RX ORDER — IBUPROFEN 800 MG/1
800 TABLET, FILM COATED ORAL
Status: DISCONTINUED | OUTPATIENT
Start: 2018-08-14 | End: 2018-08-15

## 2018-08-14 RX ORDER — ONDANSETRON 2 MG/ML
4 INJECTION INTRAMUSCULAR; INTRAVENOUS EVERY 6 HOURS PRN
Status: DISCONTINUED | OUTPATIENT
Start: 2018-08-14 | End: 2018-08-15

## 2018-08-14 RX ORDER — LIDOCAINE HYDROCHLORIDE 10 MG/ML
INJECTION, SOLUTION EPIDURAL; INFILTRATION; INTRACAUDAL; PERINEURAL
Status: DISCONTINUED
Start: 2018-08-14 | End: 2018-08-15 | Stop reason: HOSPADM

## 2018-08-14 RX ORDER — OXYTOCIN/0.9 % SODIUM CHLORIDE 30/500 ML
100-340 PLASTIC BAG, INJECTION (ML) INTRAVENOUS CONTINUOUS PRN
Status: COMPLETED | OUTPATIENT
Start: 2018-08-14 | End: 2018-08-15

## 2018-08-14 RX ORDER — OXYTOCIN 10 [USP'U]/ML
10 INJECTION, SOLUTION INTRAMUSCULAR; INTRAVENOUS
Status: DISCONTINUED | OUTPATIENT
Start: 2018-08-14 | End: 2018-08-15

## 2018-08-14 RX ORDER — SODIUM CHLORIDE, SODIUM LACTATE, POTASSIUM CHLORIDE, CALCIUM CHLORIDE 600; 310; 30; 20 MG/100ML; MG/100ML; MG/100ML; MG/100ML
INJECTION, SOLUTION INTRAVENOUS
Status: DISCONTINUED
Start: 2018-08-14 | End: 2018-08-15 | Stop reason: HOSPADM

## 2018-08-14 RX ORDER — NALOXONE HYDROCHLORIDE 0.4 MG/ML
.1-.4 INJECTION, SOLUTION INTRAMUSCULAR; INTRAVENOUS; SUBCUTANEOUS
Status: DISCONTINUED | OUTPATIENT
Start: 2018-08-14 | End: 2018-08-15

## 2018-08-14 RX ADMIN — SODIUM CHLORIDE, POTASSIUM CHLORIDE, SODIUM LACTATE AND CALCIUM CHLORIDE: 600; 310; 30; 20 INJECTION, SOLUTION INTRAVENOUS at 23:20

## 2018-08-14 RX ADMIN — FENTANYL CITRATE 100 MCG: 50 INJECTION, SOLUTION INTRAMUSCULAR; INTRAVENOUS at 23:49

## 2018-08-14 NOTE — MR AVS SNAPSHOT
After Visit Summary   8/14/2018    Lucia Lora    MRN: [id number]           Patient Information     Date Of Birth          1980        Visit Information        Provider Department      8/14/2018 2:45 PM Chan Rojas MD [facility] Maternal Fetal Medicine [facility]        Today's Diagnoses     Insulin controlled gestational diabetes mellitus (GDM) during pregnancy, antepartum    -  1    HAWK (amniotic fluid index) borderline low           Follow-ups after your visit        Your next 10 appointments already scheduled     Aug 16, 2018 11:45 AM CDT   [facility] BPP SINGLE with [id number]   [facility] Maternal Fetal Medicine Ultrasound - [place] ([facility])    [address]  [facility] [id number] [phone]            Aug 16, 2018 12:15 PM CDT   Radiology MD with UR IMAN MONTILLA   [facility] Maternal Fetal Medicine [facility] ([facility])    [address]  [facility] [address]   [phone]           Please arrive at the time given for your first appointment. This visit is used internally to schedule the physician's time during your ultrasound.            Nov 12, 2018 10:00 AM CST   (Arrive by 9:45 AM)   RETURN DIABETES with Sharri Langford MD   [facility] Endocrinology ([facility])    [address] [place] [address] [phone]              Future tests that were ordered for you today     Open Future Orders        Priority Expected Expires Ordered    [facility] Single Routine  6/14/2019 8/14/2018            Who to contact     If you have questions or need follow up information about today's clinic visit or your schedule please contact [facility] MATERNAL FETAL MEDICINE [facility] directly at [phone].  Normal or non-critical lab and imaging results will be communicated to you by MyChart, letter or phone within 4 business days after the clinic has received  the results. If you do not hear from us within 7 days, please contact the clinic through [employer] or phone. If you have a critical or abnormal lab result, we will notify you by phone as soon as possible.  Submit refill requests through [employer] or call your pharmacy and they will forward the refill request to us. Please allow 3 business days for your refill to be completed.          Additional Information About Your Visit        Care EveryWhere ID     This is your Care EveryWhere ID. This could be used by other organizations to access your [place] medical records  [id number]        Your Vitals Were     Last Period                   11/15/2017            Blood Pressure from Last 3 Encounters:   08/13/18 133/77   07/30/18 114/79   07/02/18 110/74    Weight from Last 3 Encounters:   08/13/18 122.5 kg (270 lb)   07/30/18 121.2 kg (267 lb 1.6 oz)   07/02/18 119.9 kg (264 lb 6.4 oz)              Today, you had the following     No orders found for display       Primary Care Provider Office Phone # Fax #    Clinic [facility] [phone]       [address] [facility] [address]        Equal Access to Services     CARLENE BENÍTEZ AH: Hadii aad ku hadasho Sobenitoali, waaxda luqadaha, qaybta kaalmada adeegyada, mis fisher . So [facility] [phone].    ATENCIÓN: Si habla español, tiene a hu disposición servicios gratuitos de asistencia lingüística. Llame al [phone].    We comply with applicable federal civil rights laws and [place] laws. We do not discriminate on the basis of race, color, national origin, age, disability, sex, sexual orientation, or gender identity.            Thank you!     Thank you for choosing [facility]  for your care. Our goal is always to provide you with excellent care. Hearing back from our patients is one way we can continue to improve our services. Please take a few minutes to complete the written survey that you may  receive in the mail after your visit with us. Thank you!             Your Updated Medication List - Protect others around you: Learn how to safely use, store and throw away your medicines at www.disposemymeds.org.          This list is accurate as of 8/14/18  2:55 PM.  Always use your most recent med list.                   Brand Name Dispense Instructions for use Diagnosis    insulin  UNIT/ML injection    HumuLIN N/NovoLIN N    21 mL    Administer 7 unit(s) in the morning (9 AM) and 10 U at bedtime (9 PM)        prenatal multivitamin plus iron 27-0.8 MG Tabs per tablet      Take 1 tablet by mouth

## 2018-08-14 NOTE — IP AVS SNAPSHOT
MRN:[id number]                      After Visit Summary   8/14/2018    Lucia Lora    MRN: [id number]           Thank you!     Thank you for choosing [facility] for your care. Our goal is always to provide you with excellent care. Hearing back from our patients is one way we can continue to improve our services. Please take a few minutes to complete the written survey that you may receive in the mail after you visit with us. Thank you!        Patient Information     Date Of Birth          1980        Designated Caregiver       Most Recent Value    Caregiver    Will someone help with your care after discharge? no      About your hospital stay     You were admitted on:  August 14, 2018 You last received care in the:  [facility]    You were discharged on:  August 16, 2018       Who to Call     For medical emergencies, please call 911.  For non-urgent questions about your medical care, please call your primary care provider or clinic, [phone]          Attending Provider     Provider Specialty    Trell Barron APRN [facility]    Ann Crabtree APRN [facility]       Primary Care Provider Office Phone # Fax #    Clinic [facility] [phone]      Your next 10 appointments already scheduled     Nov 12, 2018 10:00 AM CST   (Arrive by 9:45 AM)   RETURN DIABETES with Sharri Langford MD   [facility] ([facility] [place])    [address] [place] [id number] [phone]              Further instructions from your care team       Vaginal Delivery Discharge Instructions: Alverto Laneqabadka:     Waydiiso qoyska iyo saaxibadaa elmoy devang bustillos markaad u baahantahay.    Waxba goodson krishna gómez ammelissa pearcea ilaa uu dhakhtarkaagu ansixiyo.    Si fudud u qaado dhowrka asbuuc e xiga si aad u ogalaato in jirkaagu yoan kabto. [place] perez kasta oo aad rabto ilaa meeshaas laga gaarayo.    Lamontei goodson  wadin markaad qaadanayso  kaniiniyada xanuunka ee dhkhatarku kuu qoray . waxaad gaadhi wadi kartaa haddii aad qaadanayso kaniiniyada xanuunka ee koontarka.    [facility] meghaxibarryhaaga daryeelka caafimaaad haddii aad qabtid mid ka mid ah calaamadahan yoan socda:    Haddii suufka dhiigga nuuga uu ku buuxsamo 1 saac gudihiis, ama aad aragto xinjiro dhiig ah oo ka wayn kubadda golafka.     iig soconaya wax kabadan 6 asbuuc.    Haddii aad qabto dheecaan farjiga ka imaanaya oo  si xun u uraya.     [facility], [facility] 100.4  F (38  C) am aka sii saraysa (heerkulka laga qaaday carabka hoostiiisa), oo qarqaryo leh ama aan lahayn     Xanuun, nabar, majiirid daran oo aad ka dareeto qaybta hoose ee ubucda.    Xanuun sii kordya, barar, guduudasho ama dheecaan ka dhiimaya meesha la tolay ee qaliinka.    Kaadi badan oo dagdag ah oo markasta ku qabanaysa , ama gubasho aad dareento markaad kaajayso.    Guduudasho, barar, ama xanuun aad ka dareento xididada lugta.    Dhibaato kaa haysata naas nuujinta, ama guduudasho ama xanuun naaska .    Xanuun sii kordhaya ama aan ka dhamaanayn meesha la tolay ama danqashada dilaaca.    Lalabbo ama matag.    Xabad xanuun iyo qufac ama naqaska oo kugu dhagaya.    Dhibaato ay la socoto murugo, walaac, aa walwal.     Haddii aad qabtid wax elizabeth ah oo ku christopher martinezelajin young [facility] mehreen stein.     Haddii aad qabto hu aalo rachana esqueda guriga ku noqoto kadib.    Gacmahaagga nadiifi:  Markasta dhaqa gacahaaga ka hor inta aadan taaban lesliejiga agagaarkiisa  iyo meesha la tolay. Bobby waxay caawiniaysaa inuu yaraado infakshanku. Hdii gacmahaagu lucia morales aad gacmaha ku tirtirto si aad u nadiifiso gacmahaaga. Cidiyahaaga nadiifi ooo jar.      Vaginal Delivery Discharge Instructions  Activity:     Ask family and friends for help when you need it.    Do not place anything in your vagina until your doctor approves.    Take it easy for the  next few weeks to allow your body to recover. You may do any activities you feel up to at that point.    Do not drive while taking pain pills prescribed by your doctor. You may drive if taking over-the-counter pain pills.    Call your health care provider if you have any of these symptoms:    You soak a sanitary pad with blood within 1 hour, or you see blood clots larger than a golf ball.    Bleeding that lasts more than 6 weeks.    You have vaginal discharge that smells bad.     A fever of 100.4  F (38  C) or higher (temperature taken under your tongue), with or without chills     Severe, pain, cramping or tenderness in your lower belly area.    Increased pain, swelling, redness or fluid around your stitches.    A more frequent or urgent need to urinate (pee), or it burns when you pee.    Redness, swelling or pain around a vein in your leg.    Problems breastfeeding, or a red or painful area on your breast.    Pain that increases or does not go away from an episiotomy or perineal tear.    Nausea and vomiting.    Chest pain and cough or are gasping for air.    Problems coping with sadness, anxiety, or depression.     If you have any concerns about hurting yourself or the baby, call your doctor right away.      You have questions or concerns after you return home.    Keep your hands clean:  Always wash your hands before touching your perineal area and stitches.  This helps reduce your risk of infection.  If your hands aren t dirty, you may use an alcohol hand-rub to clean your hands. Keep your nails clean and short.        Pending Results     No orders found from 8/12/2018 to 8/15/2018.            Statement of Approval     Ordered          08/16/18 1139  I have reviewed and agree with all the recommendations and orders detailed in this document.  EFFECTIVE NOW     Approved and electronically signed by:  Bella Randle CNM             Admission Information     Date & Time Provider Department Dept.  Phone    8/14/2018 Bro Ann Bola, TORITO CNM [facility] [phone]      Your Vitals Were     Blood Pressure Pulse Temperature Respirations Last Period       129/87 96 98.4  F (36.9  C) 20 11/15/2017       Care EveryWhere ID     This is your Care EveryWhere ID. This could be used by other organizations to access your [facility] medical records  [id number]        Equal Access to Services     CARLENE BENÍTEZ AH: Hadii aad ku hadasho Soomaali, waaxda luqadaha, qaybta kaalmada adeegyada, waxay nataliyain haykevinn kevin walkerdotchyna fisher . So [facility] [phone].    ATENCIÓN: Si habla español, tiene a hu disposición servicios gratuitos de asistencia lingüística. Llame al [phone].    We comply with applicable federal civil rights laws and [place] laws. We do not discriminate on the basis of race, color, national origin, age, disability, sex, sexual orientation, or gender identity.               Review of your medicines      START taking        Dose / Directions    acetaminophen 325 MG tablet   Commonly known as:  TYLENOL        Dose:  650 mg   Take 2 tablets (650 mg) by mouth every 4 hours as needed   Quantity:  30 tablet   Refills:  0       ascorbic acid 250 MG tablet   Commonly known as:  VITAMIN C   Used for:  Anemia, unspecified type        Dose:  250 mg   Take 1 tablet (250 mg) by mouth daily (with breakfast)   Quantity:  90 tablet   Refills:  0       B-12 1000 MCG Tabs   Used for:  Anemia, unspecified type        Dose:  1 tablet   Take 1 tablet by mouth daily   Quantity:  90 tablet   Refills:  0       ferrous sulfate 325 (65 Fe) MG tablet   Commonly known as:  IRON   Used for:  Anemia, unspecified type        Dose:  325 mg   Take 1 tablet (325 mg) by mouth daily (with breakfast)   Quantity:  90 tablet   Refills:  0       ibuprofen 800 MG tablet   Commonly known as:  ADVIL/MOTRIN        Dose:  800 mg   Take 1 tablet (800 mg) by mouth every 6 hours as needed for other (cramping)   Quantity:  30 tablet   Refills:  0        senna-docusate 8.6-50 MG per tablet   Commonly known as:  SENOKOT-S;PERICOLACE        Dose:  1 tablet   Take 1 tablet by mouth 2 times daily   Quantity:  30 tablet   Refills:  0         CONTINUE these medicines which have NOT CHANGED        Dose / Directions    prenatal multivitamin plus iron 27-0.8 MG Tabs per tablet        Dose:  1 tablet   Take 1 tablet by mouth   Refills:  0         STOP taking     insulin  UNIT/ML injection   Commonly known as:  HumuLIN N/NovoLIN N                Where to get your medicines      These medications were sent to [facility] [place], [place] - [address]  [address] [facility], [facility] [address]     Phone:  [phone]     acetaminophen 325 MG tablet    ascorbic acid 250 MG tablet    B-12 1000 MCG Tabs    ferrous sulfate 325 (65 Fe) MG tablet    ibuprofen 800 MG tablet    senna-docusate 8.6-50 MG per tablet                Protect others around you: Learn how to safely use, store and throw away your medicines at www.disposemymeds.org.             Medication List: This is a list of all your medications and when to take them. Check marks below indicate your daily home schedule. Keep this list as a reference.      Medications           Morning Afternoon Evening Bedtime As Needed    acetaminophen 325 MG tablet   Commonly known as:  TYLENOL   Take 2 tablets (650 mg) by mouth every 4 hours as needed   Last time this was given:  650 mg on 8/16/2018  1:06 PM                                ascorbic acid 250 MG tablet   Commonly known as:  VITAMIN C   Take 1 tablet (250 mg) by mouth daily (with breakfast)                                B-12 1000 MCG Tabs   Take 1 tablet by mouth daily                                ferrous sulfate 325 (65 Fe) MG tablet   Commonly known as:  IRON   Take 1 tablet (325 mg) by mouth daily (with breakfast)                                ibuprofen 800 MG tablet   Commonly known as:  ADVIL/MOTRIN   Take 1 tablet (800 mg) by  mouth every 6 hours as needed for other (cramping)   Last time this was given:  800 mg on 8/16/2018  1:06 PM                                prenatal multivitamin plus iron 27-0.8 MG Tabs per tablet   Take 1 tablet by mouth                                senna-docusate 8.6-50 MG per tablet   Commonly known as:  SENOKOT-S;PERICOLACE   Take 1 tablet by mouth 2 times daily   Last time this was given:  2 tablets on 8/16/2018  8:51 AM

## 2018-08-14 NOTE — PLAN OF CARE
Data: [facility]   Reason for maternal/fetal assessment per patient is Decreased Amniotic Fluid Index  . Patient is a . Prenatal record reviewed.      Obstetric History       T3      L3     SAB0   TAB0   Ectopic0   Multiple0   Live Births3       # Outcome Date GA Lbr Gokul/2nd Weight Sex Delivery Anes PTL Lv   4 Current            3 Term         MATTY   2 Term         MATTY   1 Term         MATTY         Medical History: History reviewed. No pertinent past medical history.. Gestational Age 38w5d. VSS. Fetal movement present. Patient denies cramping, backache, vaginal discharge, pelvic pressure, UTI symptoms, GI problems, bloody show, vaginal bleeding, edema, headache, visual disturbances, epigastric or URQ pain, abdominal pain, rupture of membranes. Support persons present.  Action: Verbal consent for EFM. Triage assessment completed. EFM applied. Uterine assessment irregular contractions. Fetal assessment: Presumed adequate fetal oxygenation documented (see flow record). Patient education pamphlets given on fetal movement counts. Patient instructed to report change in fetal movement, vaginal leaking of fluid or bleeding, abdominal pain, or any concerns related to the pregnancy to her nurse/physician.   Response: Yessenia Palacios CNM, informed of arrival and EFM. Plan per provider is discharge home. Patient verbalized understanding of education and verbalized agreement with plan. Discharged ambulatory at 1959.

## 2018-08-14 NOTE — IP AVS SNAPSHOT
[facility]    [address] [facility] [address]    Phone:  [phone]                                       After Visit Summary   8/14/2018    Lucia Lora    MRN: [id number]           After Visit Summary Signature Page     I have received my discharge instructions, and my questions have been answered. I have discussed any challenges I see with this plan with the nurse or doctor.    ..........................................................................................................................................  Patient/Patient Representative Signature      ..........................................................................................................................................  Patient Representative Print Name and Relationship to Patient    ..................................................               ................................................  Date                                            Time    ..........................................................................................................................................  Reviewed by Signature/Title    ...................................................              ..............................................  Date                                                            Time

## 2018-08-14 NOTE — DISCHARGE INSTRUCTIONS
Discharge Instruction for Undelivered Patients      You were seen for: Fetal Assessment  We Consulted: Kriss Love CNM  You had (Test or Medicine):Diet:   Drink 8 to 12 glasses of liquids (milk, juice, water) every day.  You may eat meals and snacks.     Activity:  Count fetal kicks everyday (see handout)  Call your doctor or nurse midwife if your baby is moving less than usual.     Call your provider if you notice:  Swelling in your face or increased swelling in your hands or legs.  Headaches that are not relieved by Tylenol (acetaminophen).  Changes in your vision (blurring: seeing spots or stars.)  Nausea (sick to your stomach) and vomiting (throwing up).   Weight gain of 5 pounds or more per week.  Heartburn that doesn't go away.  Signs of bladder infection: pain when you urinate (use the toilet), need to go more often and more urgently.  The bag of oglesby (rupture of membranes) breaks, or you notice leaking in your underwear.  Bright red blood in your underwear.  Abdominal (lower belly) or stomach pain.  For first baby: Contractions (tightening) less than 5 minutes apart for one hour or more.  Second (plus) baby: Contractions (tightening) less than 10 minutes apart and getting stronger.  *If less than 34 weeks: Contractions (tightenings) more than 6 times in one hour.  Increase or change in vaginal discharge (note the color and amount)  Other:     Follow-up:  MFM will call patient tomorrow to come at the clinic foR BPP

## 2018-08-14 NOTE — PROGRESS NOTES
"Please see \"Imaging\" tab under \"Chart Review\" for details of today's US at the [facility].    Chan Rojas MD  Maternal-Fetal Medicine      "

## 2018-08-15 ENCOUNTER — OFFICE VISIT (OUTPATIENT)
Dept: INTERPRETER SERVICES | Facility: CLINIC | Age: 38
End: 2018-08-15
Payer: COMMERCIAL

## 2018-08-15 LAB
ABO + RH BLD: NORMAL
ABO + RH BLD: NORMAL
BLD GP AB SCN SERPL QL: NORMAL
BLOOD BANK CMNT PATIENT-IMP: NORMAL
ERYTHROCYTE [DISTWIDTH] IN BLOOD BY AUTOMATED COUNT: 19.3 % (ref 10–15)
GLUCOSE BLDC GLUCOMTR-MCNC: 116 MG/DL (ref 70–99)
GLUCOSE BLDC GLUCOMTR-MCNC: 118 MG/DL (ref 70–99)
GLUCOSE BLDC GLUCOMTR-MCNC: 132 MG/DL (ref 70–99)
GLUCOSE BLDC GLUCOMTR-MCNC: 135 MG/DL (ref 70–99)
GLUCOSE BLDC GLUCOMTR-MCNC: 86 MG/DL (ref 70–99)
GLUCOSE BLDC GLUCOMTR-MCNC: 88 MG/DL (ref 70–99)
GLUCOSE BLDC GLUCOMTR-MCNC: 90 MG/DL (ref 70–99)
HCT VFR BLD AUTO: 29.8 % (ref 35–47)
HGB BLD-MCNC: 9.4 G/DL (ref 11.7–15.7)
MCH RBC QN AUTO: 24.2 PG (ref 26.5–33)
MCHC RBC AUTO-ENTMCNC: 31.5 G/DL (ref 31.5–36.5)
MCV RBC AUTO: 77 FL (ref 78–100)
PLATELET # BLD AUTO: 117 10E9/L (ref 150–450)
RBC # BLD AUTO: 3.88 10E12/L (ref 3.8–5.2)
SPECIMEN EXP DATE BLD: NORMAL
WBC # BLD AUTO: 11.4 10E9/L (ref 4–11)

## 2018-08-15 PROCEDURE — 00000146 ZZHCL STATISTIC GLUCOSE BY METER IP

## 2018-08-15 PROCEDURE — 25000128 H RX IP 250 OP 636: Performed by: ADVANCED PRACTICE MIDWIFE

## 2018-08-15 PROCEDURE — 36415 COLL VENOUS BLD VENIPUNCTURE: CPT | Performed by: ADVANCED PRACTICE MIDWIFE

## 2018-08-15 PROCEDURE — 0KQM0ZZ REPAIR PERINEUM MUSCLE, OPEN APPROACH: ICD-10-PCS | Performed by: ADVANCED PRACTICE MIDWIFE

## 2018-08-15 PROCEDURE — 40000977 ZZH STATISTIC ATTENDANCE AT DELIVERY

## 2018-08-15 PROCEDURE — 72200001 ZZH LABOR CARE VAGINAL DELIVERY SINGLE

## 2018-08-15 PROCEDURE — 25000125 ZZHC RX 250: Performed by: ADVANCED PRACTICE MIDWIFE

## 2018-08-15 PROCEDURE — 85027 COMPLETE CBC AUTOMATED: CPT | Performed by: ADVANCED PRACTICE MIDWIFE

## 2018-08-15 PROCEDURE — 12000030 ZZH R&B OB INTERMEDIATE UMMC

## 2018-08-15 PROCEDURE — G0463 HOSPITAL OUTPT CLINIC VISIT: HCPCS

## 2018-08-15 PROCEDURE — T1013 SIGN LANG/ORAL INTERPRETER: HCPCS | Mod: U3

## 2018-08-15 PROCEDURE — 25000132 ZZH RX MED GY IP 250 OP 250 PS 637: Performed by: ADVANCED PRACTICE MIDWIFE

## 2018-08-15 PROCEDURE — 25000132 ZZH RX MED GY IP 250 OP 250 PS 637

## 2018-08-15 RX ORDER — HYDROCORTISONE 2.5 %
CREAM (GRAM) TOPICAL 3 TIMES DAILY PRN
Status: DISCONTINUED | OUTPATIENT
Start: 2018-08-15 | End: 2018-08-16 | Stop reason: HOSPADM

## 2018-08-15 RX ORDER — IBUPROFEN 800 MG/1
800 TABLET, FILM COATED ORAL EVERY 6 HOURS PRN
Status: DISCONTINUED | OUTPATIENT
Start: 2018-08-15 | End: 2018-08-16 | Stop reason: HOSPADM

## 2018-08-15 RX ORDER — SODIUM CHLORIDE 9 MG/ML
INJECTION, SOLUTION INTRAVENOUS
Status: DISPENSED
Start: 2018-08-15 | End: 2018-08-15

## 2018-08-15 RX ORDER — DEXTROSE, SODIUM CHLORIDE, SODIUM LACTATE, POTASSIUM CHLORIDE, AND CALCIUM CHLORIDE 5; .6; .31; .03; .02 G/100ML; G/100ML; G/100ML; G/100ML; G/100ML
INJECTION, SOLUTION INTRAVENOUS
Status: DISCONTINUED
Start: 2018-08-15 | End: 2018-08-15 | Stop reason: WASHOUT

## 2018-08-15 RX ORDER — AMOXICILLIN 250 MG
1 CAPSULE ORAL 2 TIMES DAILY
Status: DISCONTINUED | OUTPATIENT
Start: 2018-08-15 | End: 2018-08-16 | Stop reason: HOSPADM

## 2018-08-15 RX ORDER — AMOXICILLIN 250 MG
2 CAPSULE ORAL 2 TIMES DAILY
Status: DISCONTINUED | OUTPATIENT
Start: 2018-08-15 | End: 2018-08-16 | Stop reason: HOSPADM

## 2018-08-15 RX ORDER — DEXTROSE MONOHYDRATE 25 G/50ML
25-50 INJECTION, SOLUTION INTRAVENOUS
Status: DISCONTINUED | OUTPATIENT
Start: 2018-08-15 | End: 2018-08-16

## 2018-08-15 RX ORDER — OXYTOCIN/0.9 % SODIUM CHLORIDE 30/500 ML
340 PLASTIC BAG, INJECTION (ML) INTRAVENOUS CONTINUOUS PRN
Status: DISCONTINUED | OUTPATIENT
Start: 2018-08-15 | End: 2018-08-16 | Stop reason: HOSPADM

## 2018-08-15 RX ORDER — NALOXONE HYDROCHLORIDE 0.4 MG/ML
.1-.4 INJECTION, SOLUTION INTRAMUSCULAR; INTRAVENOUS; SUBCUTANEOUS
Status: DISCONTINUED | OUTPATIENT
Start: 2018-08-15 | End: 2018-08-16 | Stop reason: HOSPADM

## 2018-08-15 RX ORDER — ACETAMINOPHEN 325 MG/1
650 TABLET ORAL EVERY 4 HOURS PRN
Status: DISCONTINUED | OUTPATIENT
Start: 2018-08-15 | End: 2018-08-16 | Stop reason: HOSPADM

## 2018-08-15 RX ORDER — NICOTINE POLACRILEX 4 MG
15-30 LOZENGE BUCCAL
Status: DISCONTINUED | OUTPATIENT
Start: 2018-08-15 | End: 2018-08-16

## 2018-08-15 RX ORDER — LANOLIN 100 %
OINTMENT (GRAM) TOPICAL
Status: DISCONTINUED | OUTPATIENT
Start: 2018-08-15 | End: 2018-08-16 | Stop reason: HOSPADM

## 2018-08-15 RX ORDER — OXYTOCIN/0.9 % SODIUM CHLORIDE 30/500 ML
100 PLASTIC BAG, INJECTION (ML) INTRAVENOUS CONTINUOUS
Status: DISCONTINUED | OUTPATIENT
Start: 2018-08-15 | End: 2018-08-16 | Stop reason: HOSPADM

## 2018-08-15 RX ORDER — MISOPROSTOL 200 UG/1
400 TABLET ORAL
Status: DISCONTINUED | OUTPATIENT
Start: 2018-08-15 | End: 2018-08-16 | Stop reason: HOSPADM

## 2018-08-15 RX ORDER — OXYTOCIN 10 [USP'U]/ML
10 INJECTION, SOLUTION INTRAMUSCULAR; INTRAVENOUS
Status: DISCONTINUED | OUTPATIENT
Start: 2018-08-15 | End: 2018-08-16 | Stop reason: HOSPADM

## 2018-08-15 RX ADMIN — Medication 340 ML/HR: at 02:30

## 2018-08-15 RX ADMIN — LIDOCAINE HYDROCHLORIDE 20 ML: 10 INJECTION, SOLUTION EPIDURAL; INFILTRATION; INTRACAUDAL; PERINEURAL at 02:50

## 2018-08-15 RX ADMIN — ACETAMINOPHEN 650 MG: 325 TABLET, FILM COATED ORAL at 05:49

## 2018-08-15 RX ADMIN — METHYLERGONOVINE MALEATE 200 MCG: 0.2 INJECTION INTRAVENOUS at 02:43

## 2018-08-15 RX ADMIN — MISOPROSTOL 400 MCG: 200 TABLET ORAL at 02:35

## 2018-08-15 RX ADMIN — ACETAMINOPHEN 650 MG: 325 TABLET, FILM COATED ORAL at 18:24

## 2018-08-15 RX ADMIN — IBUPROFEN 800 MG: 800 TABLET ORAL at 05:49

## 2018-08-15 RX ADMIN — ACETAMINOPHEN 650 MG: 325 TABLET, FILM COATED ORAL at 22:23

## 2018-08-15 RX ADMIN — SENNOSIDES AND DOCUSATE SODIUM 1 TABLET: 8.6; 5 TABLET ORAL at 20:33

## 2018-08-15 RX ADMIN — IBUPROFEN 800 MG: 800 TABLET ORAL at 12:15

## 2018-08-15 RX ADMIN — IBUPROFEN 800 MG: 800 TABLET ORAL at 18:24

## 2018-08-15 RX ADMIN — ACETAMINOPHEN 650 MG: 325 TABLET, FILM COATED ORAL at 12:15

## 2018-08-15 ASSESSMENT — ACTIVITIES OF DAILY LIVING (ADL)
SWALLOWING: 0 - SWALLOWS FOODS/LIQUIDS WITHOUT DIFFICULTY
SWALLOWING: 0-->SWALLOWS FOODS/LIQUIDS WITHOUT DIFFICULTY
DRESS: 0-->INDEPENDENT
EATING: 0 - INDEPENDENT
COGNITION: 0 - NO COGNITION ISSUES REPORTED
BATHING: 0 - INDEPENDENT
BATHING: 0-->INDEPENDENT
RETIRED_COMMUNICATION: 0-->UNDERSTANDS/COMMUNICATES WITHOUT DIFFICULTY
RETIRED_EATING: 0-->INDEPENDENT
COMMUNICATION: 0 - UNDERSTANDS/COMMUNICATES WITHOUT DIFFICULTY
DRESS: 0 - INDEPENDENT
FALL_HISTORY_WITHIN_LAST_SIX_MONTHS: NO
TRANSFERRING: 0-->INDEPENDENT
TOILETING: 0 - INDEPENDENT
TRANSFERRING: 0 - INDEPENDENT
TOILETING: 0-->INDEPENDENT
AMBULATION: 0-->INDEPENDENT
AMBULATION: 0 - INDEPENDENT

## 2018-08-15 NOTE — PLAN OF CARE
Problem: Patient Care Overview  Goal: Plan of Care/Patient Progress Review  Outcome: Improving  Data: Lucia Lora transferred to 7144 via wheelchair at 0530. Baby transferred via parent's arms.  Action: Receiving unit notified of transfer: Yes. Patient and family notified of room change. This RN continued to care for patient. Belongings sent to receiving unit. Accompanied by this Registered Nurse. Oriented patient to surroundings. Call light within reach. ID bands double-checked with Zian AVALOS Charge RN.  Response: Patient tolerated transfer and is stable.

## 2018-08-15 NOTE — PROVIDER NOTIFICATION
08/14/18 2240   Provider Notification   Provider Name/Title BAILEY OrozcoCrabtree CNM   Method of Notification Phone   CNM notified that patient requesting to have her cervix re-checked. Patient denies pelvic pressure, feeling stronger uc's, palpating as mild to moderate, EFM Cat I, discussion per patient with [facility]  at bedside.  Plan per CNM to come re-assess patient in a few minutes.

## 2018-08-15 NOTE — PROVIDER NOTIFICATION
08/14/18 4663   Provider Notification   Provider Name/Title BAILEY OquendoCrabtreezoya SOLER   Method of Notification At Bedside   Notification Reason Labor Status   CNM at bedside to evaluate patient, Alverto  also present and states patient reports she is feeling pressure. SVE performed by CNM: 4/80/-2. CNM instructing patient on breathing through uc's.  Patient declining pain medication currently. Plan per CNM  for continuous monitoring, IV, and blood glucoses every 2 hours. This RN and Alverto  bedside, providing labor support, along with patient's significant other, Zach.

## 2018-08-15 NOTE — L&D DELIVERY NOTE
Delivery Summary - No Merrill  Delivery Summary  DELIVERY NOTE:  Brief Labor Course:  present for labor and birth. pt arrived in active labor with intact BOW. Had been scheduled for IOL today for oligo and insulin dependent GDM, poorly controlled. Progressed to 9cm with strong urge to push and had SROM thick meconium fluid. NICU and Dr Garcia called for delivery. Pushed x2 to .  Delivery Note:   Tight perineum and anterior circumcision not stretching with strong maternal pushing effort. Small posterior and small anterior episiotomy cut with pt consent.  viable male and placed on moms abd. Stimulated to cry with good tone. Brought to warmer for evaluation by NICU for weak cry. spont murillo placenta delivered intact with IV pitocin opened after delivery of baby. Moderate free flow and small clots after placenta resolved with fundal massage, oral misoprostol and IM Methergine. Dr garcia inspected perineum and repaired small anterior episiotomy with 4-0 vicryl over 1% lidocaine. ML episiotomy repaired with 3-0 vicryl over 1% lidocaine. Pt used nitrous for repair with good relief. Mom and infant stable.  IUP at 39 weeks gestation delivered on August 15, 2018.     delivery of a viable Male infant.  Weight : 9 pounds 5 ounces   Apgars of 7 at 1 minute and 9 at 5 minutes.  Labor was spontaneous.  Medications administered  in labor:  Pain Rx Fentanyl and Nitrous Oxide; Antibiotics No; Other   Perineum: Midline Episiotomy, Anterior Episiotomy  Placenta-mechanism: spontaneous, intact,  with a 3 vessel cord. IV oxytocin given after delivery of the baby, and IM Methergine, and oral misoprostol were given after delivery of placenta.  Quantitative Blood Loss was pending. EBL 800cc.  Complications of labor and delivery: Gestational Diabetes -insulin dependent, poorly controlled, Macrosomia, Meconium stained fluid, Oligohydramnios and Postpartum Hemorrhage  Anticipated Discharge Date: 18  Birth  attendants: Ann Crabtree CNM and MD Ann Calvin, APRN BUSTER    Lucia Lora MRN# [id number]   Age: 38 year old YOB: 1980     Labor Event Times:    Labor Onset Date       Labor Onset Time    Dilation Complete Date    Dilation Complete Time       Start Pushing Date        Start Pushing Time            Labor Length:    1st Stage (hrs/min)     2nd Stage (hrs/min)     3rd Stage (hrs/min)         Labor Events:     Labor No   Rupture Date     Rupture Time     Rupture Type Spontaneous rupture of membranes occuring during spontaneous labor or augmentation   Fluid Color     Labor Type     Induction    Induction Indication         Augmentation    Labor Complications     Additional Complications     Management of Labor        Antibiotics     IV Antibiotic Given     Additional Management     Fetal Status Prior to  Delivery     Fetal Status Comments         Cervical Ripening:    Date     Time     Type         Delivery:    Episiotomy Anterior Episiotomy   Median   Local Anesthetic        Lacerations    Sponge Count Correct       Needle Count Correct     Final Count by:    Sutures     Blood loss (ml)    Packing Intentionally Left In     Number     Comments           Information for the patient's :  Denilson Lora [[id number]]       Delivery  8/15/2018 2:29 AM by  Vaginal, Spontaneous Delivery  Sex:  male Gestational Age: 39w0d  Delivery Clinician:     Living?:            APGARS  One minute Five minutes Ten minutes   Skin color:            Heart rate:            Grimace:            Muscle tone:            Breathing:            Totals: 7  9         Presentation/position:           Resuscitation and Interventions: Method:     Oxygen Type:     Intubation Time:   # of Attempts:     ETT Size:        Tracheal Suction:     Tracheal returns:       Care at Delivery:           Cord information:     Disposition of cord blood:      Blood gases sent?    Complications:      Placenta: Delivered:           appearance.  Comments:  .  Disposition: Hospital disposal   Measurements:  Weight:    Height:    Head circumference:    Chest circumference:     Temperature:     Other providers:       Additional  information:  Forceps:    Verbal Informed Consent Obtained:       Alternative Labor Strategies Discussed:     Emergency Resources Available:       Type:       Accrued Pulling Time:       # of Pulls:      Position:     Fetal Station:       Indications:      Other Indications:     Operative Vaginal Delivery Brief Note Forceps:        Vacuum:    Verbal Informed Consent Obtained:     Alternative Labor Strategies Discussed:     Emergency Resources Available:     Type:      Accrued Pulling Time:       # of Pop-Offs:       # of Pulls:       Position:     Fetal Station:      Indications for Vacuum:       Other Indications:    Operative Vaginal Delivery Brief Note Vacuum:        Shoulder Dystocia Shoulder Dystocia    Fetal Tracing Prior to Delivery:  Category 2   Shoulder dystocia present?:  No                                            Breech:       : Type:     Indications for Primary:     Indications for Secondary:     Other Indications:        Observed anomalies     Output in Delivery Room:

## 2018-08-15 NOTE — PLAN OF CARE
Problem: Patient Care Overview  Goal: Plan of Care/Patient Progress Review  Outcome: Improving  Vital signs stable currently, has had occasional elevated BP's, none in sever range, and BAILEY Crabtree CNM aware. Postpartum assessment WDL. Pain controlled with Ibuprofen and Tylenol.. Patient voiding without difficulty. Breastfeeding on cue with minimal assist. GDM, will check am blood glucose before breakfast. Parents and  bonding well. Will continue with current plan of care.

## 2018-08-15 NOTE — PLAN OF CARE
Problem: Patient Care Overview  Goal: Plan of Care/Patient Progress Review  Outcome: Improving  BG elevated=132.  Patient had already been drinking her orange juice, started meal, and prior to that, has steadily been drinking her Cuban sweetened tea. Would not consider this an AC blood sugar.  Will plan to recheck BG prior to lunch meal.

## 2018-08-15 NOTE — PLAN OF CARE
Problem: Postpartum (Vaginal Delivery) (Adult,Obstetrics,Pediatric)  Goal: Signs and Symptoms of Listed Potential Problems Will be Absent, Minimized or Managed (Postpartum)  Signs and symptoms of listed potential problems will be absent, minimized or managed by discharge/transition of care (reference Postpartum (Vaginal Delivery) (Adult,Obstetrics,Pediatric) CPG).   Outcome: No Change  Data: Vital signs within normal limits. Postpartum checks within normal limits - see flow record. Patient eating and drinking normally. Patient able to empty bladder independently and is up ambulating. No apparent signs of infection. Perineum healing well. Patient performing self cares and is able to care for infant. BG elevated this morning (132), but patient had NOT refrained from eating before BG was checked. The pre-lunch BG=88.  Patient had not eaten in the two hours prior to the BG check.  Action: Patient medicated during the shift for cramping and general discomfort. See MAR. Patient reassessed within 1 hour after each medication and pain was improved - patient stated she was comfortable. Patient education done about need to collect BGs on her infant and herself prior to eating/feeding. Reviewed this with her with assist of . See flow record.  Response: Positive attachment behaviors observed with infant. Has had female support person present this morning, and spouse arrived approx noon..   Plan: Anticipate discharge tomorrow, if patient continues to progress and recover as desired.

## 2018-08-15 NOTE — PLAN OF CARE
Problem: Labor (Cervical Ripen, Induct, Augment) (Adult,Obstetrics,Pediatric)  Goal: Signs and Symptoms of Listed Potential Problems Will be Absent, Minimized or Managed (Labor)  Signs and symptoms of listed potential problems will be absent, minimized or managed by discharge/transition of care (reference Labor (Cervical Ripen, Induct, Augment) (Adult,Obstetrics,Pediatric) CPG).  Outcome: Completed Date Met: 08/15/18   @0229, viable male , dried and stimulated, brought to mother's chest, with delayed cord clamping, and NICU present at delivery for meconium stained fluid and GDM,/possible LGA.   brought to warmer after cord clamped, for assessment by NICU NNP,  Apgars 7/9, both mother and baby stable.

## 2018-08-15 NOTE — PROGRESS NOTES
Called to [facility] for delivery of this 39 y/o  at 39 weeks secondary to IDDM this pregnancy and evidence of diabetic fetopathy on last growth ultrasound by Noe Crabtree CNM.   When the head was at a crown, the anterior circumcision was taken down to facilitate delivery of the head, following this the head delivered then the shoulders without difficulty.  Following delivery of the placenta, her perineum was inspected.  She had a shallow second degree perineal laceration and extension of the anterior circumcision takedown to just above the urethra where brisk bleeding was noted.  The areas were infiltrated with 1% lidocaine and a straight catheter placed into her bladder to demarcate the urethra.  Two figure of X sutures of 4-0 vicryl were used to close the deep portion of the circumcision take down with good hemostasis following.  The skin edges were then reapproximated with a subcuticular suture of 4-0 vicryl at the patient's request.  The remainder of the repair was completed by Noe Crabtree CNM.    Corine Garcia MD, FACOG

## 2018-08-15 NOTE — PROGRESS NOTES
Postpartum Day of Delivery Note:  SIGNIFICANT PROBLEMS:  Patient Active Problem List    Diagnosis Date Noted      (normal spontaneous vaginal delivery) 08/15/2018     Priority: Medium     Encounter for triage in pregnant patient 2018     Priority: Medium     Labor and delivery, indication for care 2018     Priority: Medium     Amniotic fluid leaking 2018     Priority: Medium     Benign gestational thrombocytopenia in third trimester (H) 2018     Priority: Medium     Supervision of high risk pregnancy in third trimester - [facility] pt. Please call [phone] if questions 2018     Priority: Medium     LGA (large for gestational age) fetus affecting management of mother 2018     Priority: Medium     18: Increased interval fetal growth with AC at the 98th percentile.       Thyromegaly 2018     Priority: Medium     Insulin controlled gestational diabetes mellitus (GDM) during pregnancy, antepartum 2018     Priority: Medium     Gestational diabetes 2018     Priority: Medium     INTERVAL HISTORY:  /63  Temp 98.4  F (36.9  C)  Resp 18  LMP 11/15/2017  Breastfeeding? Unknown  Pt stable, baby is rooming in.   Breast feeding status: initiated, denies issue with latch or pain with nursing.  Complications since 2 hours post delivery: elevated BP x 1, normal range when repeated.  Patient is tolerating activity well. Voiding without difficulty, anxious about urination due to potential discomfort.  Cramping is minimal, lochia is decreasing and patient denies clots.  Perineal pain is is minimal.  The perineum laceration is well approximated.  Has had multiple elevated blood sugars (, 132) in postpartum period, not fasting.    Postpartum hemoglobin   Hemoglobin   Date Value Ref Range Status   08/15/2018 9.4 (L) 11.7 - 15.7 g/dL Final     Blood type   Lab Results   Component Value Date    ABO A 2018       Lab Results   Component Value Date    RH Neg  08/14/2018     Rubella status: immune  History of depression: denies. Postpartum depression warning signs reviewed.    ASSESSMENT/PLAN:  Normal postpartum exam, stable day of delivery  Complications: hx of gestational diabetes, plan for 2 hour OGTT at 6 wks postpartum  Plan d/c home as scheduled. Home Visit Ordered- No  RTC 6 weeks  Consulted with Dr. Butt re: elevated blood sugars. Advised sliding scale of insulin at this time. Endocrine referral placed for evaluation while inpatient.    Current Discharge Medication List      CONTINUE these medications which have NOT CHANGED    Details   insulin NPH (HUMULIN N/NOVOLIN N) 100 UNIT/ML injection Administer 7 unit(s) in the morning (9 AM) and 10 U at bedtime (9 PM)  Qty: 21 mL, Refills: 3      Prenatal Vit-Fe Fumarate-FA (PRENATAL MULTIVITAMIN PLUS IRON) 27-0.8 MG TABS per tablet Take 1 tablet by mouth           TORITO Guerrero CNM

## 2018-08-15 NOTE — PROVIDER NOTIFICATION
08/15/18 0643   Provider Notification   Provider Name/Title Noe Bro CNM   Method of Notification Electronic Page   Notification Reason Status Update   CNM notified of BP's 146/106 at 0549, when patient reporting perineal pain, and Ibuprofen and Tylenol given.  BP re-checked at 0630: 138/87, patient resting/sleeping with minimal pain.  CNM also notified that  two hours after delivery, after pt drank 8oz of tea with sugar, will plan to re-check before breakfast.  Plan will follow routine Postpartum orders unless otherwise notified by Provider.

## 2018-08-15 NOTE — H&P
ADMIT NOTE  =================  38w6d    Lucia oLra is a 38 year old female with an Patient's last menstrual period was 11/15/2017. and Estimated Date of Delivery: Aug 22, 2018 [facility] at 11:50 PM with in early labor.     HPI  ================  [facility] pt, Turkmen speaking- intepreter here. Pt arrived chapito since this afternoon, intact BOW, no bleeding. Baby active. Was scheduled for IOL tomorrow for oligo. Seen by MFM yesterday and today with BPP , HAWK 5.5, had neg RomPlus yesterday.   Hx of gestational diabetes on insulin poorly controlled. Growth ultrasound 18- 87% growth with AC > 97%. EFW 3640gm  Was noted to be 1cm on admission but began to have stronger contractions and was feeling pressure. Cervix reexamined and was 4/80/-2/ not well applied. Pt requesting fentanyl IV. Dr Padgett standing by as Dr Garcia in the OR.  Contractions- moderate, strong and cramping  Fetal movement- active  ROM- no   Vaginal bleeding- none  GBS- negative  FOB- is involved,  here  Other labor support- family here    Weight gain- 243 - 270 lbs, Total weight gain- 27 lbs  Height- 69  BMI- 36  First prenatal visit at 7 weeks, Total visits- 12    PROBLEM LIST  =================  Patient Active Problem List    Diagnosis Date Noted     Encounter for triage in pregnant patient 2018     Priority: Medium     Labor and delivery, indication for care 2018     Priority: Medium     Amniotic fluid leaking 2018     Priority: Medium     Benign gestational thrombocytopenia in third trimester (H) 2018     Priority: Medium     Supervision of high risk pregnancy in third trimester - [facility] pt. Please call [phone] if questions 2018     Priority: Medium     LGA (large for gestational age) fetus affecting management of mother 2018     Priority: Medium     18: Increased interval fetal growth with AC at the 98th percentile.       Thyromegaly  2018     Priority: Medium     Insulin controlled gestational diabetes mellitus (GDM) during pregnancy, antepartum 2018     Priority: Medium     Gestational diabetes 2018     Priority: Medium       HISTORIES  ============  No Known Allergies  No past medical history on file.  No past surgical history on file..  No family history on file.  Social History   Substance Use Topics     Smoking status: Never Smoker     Smokeless tobacco: Never Used     Alcohol use No     Obstetric History       T3      L3     SAB0   TAB0   Ectopic0   Multiple0   Live Births3       # Outcome Date GA Lbr Gokul/2nd Weight Sex Delivery Anes PTL Lv   4 Current            3 Term         MATTY   2 Term         MATTY   1 Term         MATTY           LABS:   ===========  Prenatal Labs: per [facility]  Aneg  Rubella immune  HIV neg  HBsAg neg  RPR neg  Rhogam indicated and given on 18   hgb A1c- 6.2  18- GBS neg     Lab Results   Component Value Date    ABO PENDING 2018    RH  Neg 02/10/2011    AS PENDING 2018    HGB 11.1 (L) 2018       Other labs:  Results for orders placed or performed during the hospital encounter of 18 (from the past 24 hour(s))   ABO/Rh type and screen   Result Value Ref Range    ABO PENDING     Antibody Screen PENDING     Test Valid Only At          [facility]    Specimen Expires 2018    CBC with platelets differential   Result Value Ref Range    WBC 6.6 4.0 - 11.0 10e9/L    RBC Count 4.54 3.8 - 5.2 10e12/L    Hemoglobin 11.1 (L) 11.7 - 15.7 g/dL    Hematocrit 34.9 (L) 35.0 - 47.0 %    MCV 77 (L) 78 - 100 fl    MCH 24.4 (L) 26.5 - 33.0 pg    MCHC 31.8 31.5 - 36.5 g/dL    RDW 19.1 (H) 10.0 - 15.0 %    Platelet Count 110 (L) 150 - 450 10e9/L    Diff Method Automated Method     % Neutrophils 58.2 %    % Lymphocytes 33.2 %    % Monocytes 7.0 %    % Eosinophils 0.6 %    % Basophils 0.2 %    % Immature Granulocytes 0.8 %     Nucleated RBCs 0 0 /100    Absolute Neutrophil 3.8 1.6 - 8.3 10e9/L    Absolute Lymphocytes 2.2 0.8 - 5.3 10e9/L    Absolute Monocytes 0.5 0.0 - 1.3 10e9/L    Absolute Eosinophils 0.0 0.0 - 0.7 10e9/L    Absolute Basophils 0.0 0.0 - 0.2 10e9/L    Abs Immature Granulocytes 0.1 0 - 0.4 10e9/L    Absolute Nucleated RBC 0.0        Recent Labs  Lab 08/13/18  1605 08/13/18  1514   GLC 73  --    BGM  --  83   glucose today 86    ROS  =========  Pt denies significant respiratory, cardiovacular, GI, or muscular/skeletalcomplaints.    See RN data base ROS.       PHYSICAL EXAM:  ===============  /85  Temp 98.4  F (36.9  C)  Resp 18  LMP 11/15/2017  General appearance: uncomfortable with contractions  GENERAL APPEARANCE: healthy, alert and no distress  RESP: lungs clear to auscultation - no rales, rhonchi or wheezes  CV: regular rates and rhythm, normal S1 S2, no S3 or S4 and no murmur,and no varicosities  ABDOMEN:  soft, nontender, no epigastric pain  SKIN: no suspicious lesions or rashes  NEURO: Denies headache, blurred vision, other vision changes  PSYCH: mentation appears normal. and affect normal/bright  Legs: trace edema      Abdomen: gravid, vertex fetus per Leopold's, non-tender between contractions.   Cephalic presentation confirmed by BSUS  EFW-  9 lbs.   CONTACTIONS: moderate, cramping and every 2-3 minutes  FETAL HEART TONES: continuous EFM- baseline 125 with moderate variability and positive accelerations. No decelerations.  PELVIC EXAM: 4/ 80%/ Posterior/ soft/ -2   SOTO SCORE: na  BLOODY SHOW: no   ROM:no  FLUID: none  ROMPLUS: not done    # Pain Assessment:  Current Pain Score 8/14/2018   Patient currently in pain? yes   Pain location Abdomen   Pain descriptors Cramping   - Lucia is experiencing pain due to labor. Pain management was discussed with Lucia and her family and the plan was created in a collaborative fashion.  Lucia's response to the current recommendations: engaged  - fentanyl  now    ASSESSMENT:  ==============  IUP @ 38w6d admitted in early labor   NST REACTIVE  Fetal Heart Rate - category one  GBS- negative  GDM on insulin poorly controlled  Oligo     PLAN:  ===========  Admit - see IP orders  Insulin per order set. Titrate to glucose. MD team to follow  Pain medication options reviewed. Pt is interested in fentanyl now  MD on call Dr Garcia consulted. Plan to transfer care to MD team or co-manage with MD team present for delivery.  Anticipate   Ann Kaur, TORITO GOINSM

## 2018-08-15 NOTE — PROGRESS NOTES
Blood pressure 137/71, temperature 98  F (36.7  C), temperature source Oral, resp. rate 18, last menstrual period 11/15/2017.  Patient Vitals for the past 24 hrs:   BP Temp Temp src Resp   08/14/18 2359 137/71 98  F (36.7  C) Oral 18   08/14/18 2052 139/85 98.4  F (36.9  C) - 18    here  General appearance: uncomfortable with contractions  Some rest with fentanyl IV. still feeling pressure.   CONTACTIONS: moderate, cramping and every 2-3 minutes  Pitocin- none,  Antibiotics- none  FETAL HEART TONES: continuous EFM- baseline 120 with moderate variability and positive accelerations. No decelerations.  ROM: not ruptured  PELVIC EXAM:SVe per Dr Myhre 5/100/-2  # Pain Assessment:  Current Pain Score 8/15/2018   Patient currently in pain? yes   Pain location Abdomen   Pain descriptors Cramping   - Lucia is experiencing pain due to labor. Pain management was discussed with Lucia and her family and the plan was created in a collaborative fashion.  Lucia's response to the current recommendations: engaged  - considering epidural  Results for orders placed or performed during the hospital encounter of 08/14/18   CBC with platelets differential   Result Value Ref Range    WBC 6.6 4.0 - 11.0 10e9/L    RBC Count 4.54 3.8 - 5.2 10e12/L    Hemoglobin 11.1 (L) 11.7 - 15.7 g/dL    Hematocrit 34.9 (L) 35.0 - 47.0 %    MCV 77 (L) 78 - 100 fl    MCH 24.4 (L) 26.5 - 33.0 pg    MCHC 31.8 31.5 - 36.5 g/dL    RDW 19.1 (H) 10.0 - 15.0 %    Platelet Count 110 (L) 150 - 450 10e9/L    Diff Method Automated Method     % Neutrophils 58.2 %    % Lymphocytes 33.2 %    % Monocytes 7.0 %    % Eosinophils 0.6 %    % Basophils 0.2 %    % Immature Granulocytes 0.8 %    Nucleated RBCs 0 0 /100    Absolute Neutrophil 3.8 1.6 - 8.3 10e9/L    Absolute Lymphocytes 2.2 0.8 - 5.3 10e9/L    Absolute Monocytes 0.5 0.0 - 1.3 10e9/L    Absolute Eosinophils 0.0 0.0 - 0.7 10e9/L    Absolute Basophils 0.0 0.0 - 0.2 10e9/L    Abs Immature Granulocytes  0.1 0 - 0.4 10e9/L    Absolute Nucleated RBC 0.0    ABO/Rh type and screen   Result Value Ref Range    ABO A     RH(D) Neg     Antibody Screen Neg     Test Valid Only At          [facility]    Specimen Expires 2018          ASSESSMENT:  ==============  IUP @ 39w0d in active labor   GDM on insulin, poorly controlled  oligo  Fetal Heart Rate Tracing category one  GBS- negative    PLAN:  ===========  Consult with Dr Garcia and Dr Myhre. Plan to comanage with MD team present for delivery and to manage insulin dependent GDM.   Anticipate , high risk shoulder dystocia  Epidural prn  TORITO Linda CNM

## 2018-08-16 ENCOUNTER — DISCHARGE SUMMARY NURSING HOME (OUTPATIENT)
Dept: CARE COORDINATION | Facility: CLINIC | Age: 38
End: 2018-08-16

## 2018-08-16 ENCOUNTER — DOCUMENTATION ONLY (OUTPATIENT)
Dept: CARE COORDINATION | Facility: CLINIC | Age: 38
End: 2018-08-16

## 2018-08-16 VITALS
DIASTOLIC BLOOD PRESSURE: 87 MMHG | TEMPERATURE: 98.4 F | HEART RATE: 96 BPM | SYSTOLIC BLOOD PRESSURE: 129 MMHG | RESPIRATION RATE: 20 BRPM

## 2018-08-16 DIAGNOSIS — Z53.9 ERRONEOUS ENCOUNTER--DISREGARD: Primary | ICD-10-CM

## 2018-08-16 LAB
ERYTHROCYTE [DISTWIDTH] IN BLOOD BY AUTOMATED COUNT: 20.1 % (ref 10–15)
GLUCOSE BLDC GLUCOMTR-MCNC: 91 MG/DL (ref 70–99)
HCT VFR BLD AUTO: 26.7 % (ref 35–47)
HGB BLD-MCNC: 8.3 G/DL (ref 11.7–15.7)
MCH RBC QN AUTO: 24.4 PG (ref 26.5–33)
MCHC RBC AUTO-ENTMCNC: 31.1 G/DL (ref 31.5–36.5)
MCV RBC AUTO: 79 FL (ref 78–100)
PLATELET # BLD AUTO: 121 10E9/L (ref 150–450)
RBC # BLD AUTO: 3.4 10E12/L (ref 3.8–5.2)
T PALLIDUM AB SER QL: NONREACTIVE
WBC # BLD AUTO: 7.1 10E9/L (ref 4–11)

## 2018-08-16 PROCEDURE — 25000132 ZZH RX MED GY IP 250 OP 250 PS 637: Performed by: ADVANCED PRACTICE MIDWIFE

## 2018-08-16 PROCEDURE — 85027 COMPLETE CBC AUTOMATED: CPT | Performed by: ADVANCED PRACTICE MIDWIFE

## 2018-08-16 PROCEDURE — 90715 TDAP VACCINE 7 YRS/> IM: CPT | Performed by: ADVANCED PRACTICE MIDWIFE

## 2018-08-16 PROCEDURE — 36415 COLL VENOUS BLD VENIPUNCTURE: CPT | Performed by: ADVANCED PRACTICE MIDWIFE

## 2018-08-16 PROCEDURE — 25000128 H RX IP 250 OP 636: Performed by: ADVANCED PRACTICE MIDWIFE

## 2018-08-16 PROCEDURE — 00000146 ZZHCL STATISTIC GLUCOSE BY METER IP

## 2018-08-16 RX ORDER — FERROUS SULFATE 325(65) MG
325 TABLET ORAL
Qty: 90 TABLET | Refills: 0 | Status: SHIPPED | OUTPATIENT
Start: 2018-08-16

## 2018-08-16 RX ORDER — AMOXICILLIN 250 MG
1 CAPSULE ORAL 2 TIMES DAILY
Qty: 30 TABLET | Refills: 0 | Status: SHIPPED | OUTPATIENT
Start: 2018-08-16

## 2018-08-16 RX ORDER — CYANOCOBALAMIN (VITAMIN B-12) 1000 MCG
1 TABLET ORAL DAILY
Qty: 90 TABLET | Refills: 0 | Status: SHIPPED | OUTPATIENT
Start: 2018-08-16

## 2018-08-16 RX ORDER — MULTIVIT WITH MINERALS/LUTEIN
250 TABLET ORAL
Qty: 90 TABLET | Refills: 0 | Status: SHIPPED | OUTPATIENT
Start: 2018-08-16

## 2018-08-16 RX ORDER — METHYLPREDNISOLONE SODIUM SUCCINATE 125 MG/2ML
125 INJECTION, POWDER, LYOPHILIZED, FOR SOLUTION INTRAMUSCULAR; INTRAVENOUS
Status: DISCONTINUED | OUTPATIENT
Start: 2018-08-16 | End: 2018-08-16 | Stop reason: HOSPADM

## 2018-08-16 RX ORDER — IBUPROFEN 800 MG/1
800 TABLET, FILM COATED ORAL EVERY 6 HOURS PRN
Qty: 30 TABLET | Refills: 0 | Status: SHIPPED | OUTPATIENT
Start: 2018-08-16

## 2018-08-16 RX ORDER — ACETAMINOPHEN 325 MG/1
650 TABLET ORAL EVERY 4 HOURS PRN
Qty: 30 TABLET | Refills: 0 | Status: SHIPPED | OUTPATIENT
Start: 2018-08-16

## 2018-08-16 RX ORDER — DIPHENHYDRAMINE HYDROCHLORIDE 50 MG/ML
50 INJECTION INTRAMUSCULAR; INTRAVENOUS
Status: DISCONTINUED | OUTPATIENT
Start: 2018-08-16 | End: 2018-08-16 | Stop reason: HOSPADM

## 2018-08-16 RX ADMIN — IBUPROFEN 800 MG: 800 TABLET ORAL at 13:06

## 2018-08-16 RX ADMIN — ACETAMINOPHEN 650 MG: 325 TABLET, FILM COATED ORAL at 13:06

## 2018-08-16 RX ADMIN — SENNOSIDES AND DOCUSATE SODIUM 2 TABLET: 8.6; 5 TABLET ORAL at 08:51

## 2018-08-16 RX ADMIN — ACETAMINOPHEN 650 MG: 325 TABLET, FILM COATED ORAL at 06:55

## 2018-08-16 RX ADMIN — ACETAMINOPHEN 650 MG: 325 TABLET, FILM COATED ORAL at 01:59

## 2018-08-16 RX ADMIN — CLOSTRIDIUM TETANI TOXOID ANTIGEN (FORMALDEHYDE INACTIVATED), CORYNEBACTERIUM DIPHTHERIAE TOXOID ANTIGEN (FORMALDEHYDE INACTIVATED), BORDETELLA PERTUSSIS TOXOID ANTIGEN (GLUTARALDEHYDE INACTIVATED), BORDETELLA PERTUSSIS FILAMENTOUS HEMAGGLUTININ ANTIGEN (FORMALDEHYDE INACTIVATED), BORDETELLA PERTUSSIS PERTACTIN ANTIGEN, AND BORDETELLA PERTUSSIS FIMBRIAE 2/3 ANTIGEN 0.5 ML: 5; 2; 2.5; 5; 3; 5 INJECTION, SUSPENSION INTRAMUSCULAR at 13:29

## 2018-08-16 RX ADMIN — IRON SUCROSE 300 MG: 20 INJECTION, SOLUTION INTRAVENOUS at 13:21

## 2018-08-16 RX ADMIN — IBUPROFEN 800 MG: 800 TABLET ORAL at 01:11

## 2018-08-16 RX ADMIN — ACETAMINOPHEN 650 MG: 325 TABLET, FILM COATED ORAL at 17:30

## 2018-08-16 RX ADMIN — IBUPROFEN 800 MG: 800 TABLET ORAL at 06:56

## 2018-08-16 NOTE — DISCHARGE INSTRUCTIONS
Vaginal Delivery Discharge Instructions: [facility]  Waxqabadka:     Waydiiso qoyska iyo saaxibadaa inay ku caawiyaan markaad u baahantahay.    Waxba goodson kor saarin ama goodson galin farjigaaga ilaa uu dhakhtarkaagu ansixiyo.    Si fudud u qaado dhowrka asbuuc e xiga si aad u ogalaato in jirkaagu yoan kabto. Waxaad samayn kartaa howl kasta oo aad rabto ilaa meeshaas laga gaarayo.    Gaadhi goodson wadin markaad qaadanayso  kaniiniyada xanuunka ee dhkhatarku kuu qoray . waxaad gaadhi wadi kartaa haddii aad qaadanayso kaniiniyada xanuunka ee koontarka.    Wac bixiyahaaga daryeelka caafimaaad haddii aad qabtid mid ka mid ah calaamadahan yoan socda:    Haddii suufka dhiigga nuuga uu ku buuxsamo 1 saac gudihiis, ama aad aragto xinjiro dhiig ah oo ka wayn kubadda golafka.     Dhiig soconaya wax kabadan 6 asbuuc.    Haddii aad qabto dheecaan farjiga ka imaanaya oo  si xun u uraya.     [facility]  F (38  C) am aka sii saraysa (heerkulka laga qaaday carabka hoostiiisa), oo qarqaryo leh ama aan lahayn     Xanuun, nabar, majiirid daran oo aad ka dareeto qaybta hoose ee ubucda.    Xanuun sii kordya, barar, guduudasho ama dheecaan ka dhiimaya meesha la tolay ee qaliinka.    Kaadi badan oo dagdag ah oo markasta ku qabanaysa , ama gubasho aad dareento markaad kaajayso.    Guduudasho, barar, ama xanuun aad ka dareento xididada lugta.    Dhibaato kaa haysata naas nuujinta, ama guduudasho ama xanuun naaska ah.    Xanuun sii kordhaya ama aan ka dhamaanayn meesha la tolay ama danqashada dilaaca.    Lalabbo ama matag.    Xabad xanuun iyo qufac ama naqaska oo kukailey zuñiga.    Dhibaato ay la socoto murugcarine, nayeli, aa elizabeth.     Haddii aad qabtid wax elizabeth wilks oo ku saabskarlo inaad waxyeelayso naftaada ama ilmaha, wac mehreen jailyncarol connoriioleg.     Haddii aad qabto hu aalo rachana siddiqi noqoto kadib.    Gacmahaagga nadiifi:  Markasta dhaqa gacahaaga ka hor inta aadan taaban farjiga agagaarkiisa  iyo meesha la tolay. Bobby abebe  caawiniaysaa mono villarrealado infakshanku. Hdii gacmahaagu lucia morales gacmaha devang tirtirzak donohue u nadiifiso gacmahaaga. Sumanireny nadiifi ooo jar.      Vaginal Delivery Discharge Instructions  Activity:     Ask family and friends for help when you need it.    Do not place anything in your vagina until your doctor approves.    Take it easy for the next few weeks to allow your body to recover. You may do any activities you feel up to at that point.    Do not drive while taking pain pills prescribed by your doctor. You may drive if taking over-the-counter pain pills.    Call your health care provider if you have any of these symptoms:    You soak a sanitary pad with blood within 1 hour, or you see blood clots larger than a golf ball.    Bleeding that lasts more than 6 weeks.    You have vaginal discharge that smells bad.     A fever of 100.4  F (38  C) or higher (temperature taken under your tongue), with or without chills     Severe, pain, cramping or tenderness in your lower belly area.    Increased pain, swelling, redness or fluid around your stitches.    A more frequent or urgent need to urinate (pee), or it burns when you pee.    Redness, swelling or pain around a vein in your leg.    Problems breastfeeding, or a red or painful area on your breast.    Pain that increases or does not go away from an episiotomy or perineal tear.    Nausea and vomiting.    Chest pain and cough or are gasping for air.    Problems coping with sadness, anxiety, or depression.     If you have any concerns about hurting yourself or the baby, call your doctor right away.      You have questions or concerns after you return home.    Keep your hands clean:  Always wash your hands before touching your perineal area and stitches.  This helps reduce your risk of infection.  If your hands aren t dirty, you may use an alcohol hand-rub to clean your hands. Keep your nails clean and short.

## 2018-08-16 NOTE — PROGRESS NOTES
[facility] will be sharing updates with you on Maternal Child Health Referral requests for home care services.  This is for care coordination purposes and alert you to referral status.  We received the referral for  Lucia Lora; MRN [id number] and want to update you:    Home visit for postpartum/  assessment and education offered to patient.  Spoke with patient with [facility] , patient declined need for home care visit.  Advised to follow up with Primary Care Providers for mom and baby.      [facility]  Michelle De La Paz  [phone]

## 2018-08-16 NOTE — PLAN OF CARE
Problem: Patient Care Overview  Goal: Plan of Care/Patient Progress Review  Outcome: Improving  Data: Vital signs and postpartum checks WDL - see flow record. Patient eating and drinking normally. Patient able to empty bladder independently and is up ambulating.  Patient performing self cares and is able to care for infant. Fasting BG = 91. Breastfeeding and supplementing with Formula.  Action: Patient medicated during the shift for pain with Tylenol and Ibuprofen. Patient education done see education record.  Response: Positive attachment behaviors observed with infant. Support persons  present.    Plan: Continue with the plan of care and notify provider if decline in status. Will continue to monitor and assess.

## 2018-08-16 NOTE — PLAN OF CARE
Problem: Postpartum (Vaginal Delivery) (Adult,Obstetrics,Pediatric)  Goal: Signs and Symptoms of Listed Potential Problems Will be Absent, Minimized or Managed (Postpartum)  Signs and symptoms of listed potential problems will be absent, minimized or managed by discharge/transition of care (reference Postpartum (Vaginal Delivery) (Adult,Obstetrics,Pediatric) CPG).   Data: Vital signs within normal limits. Postpartum checks within normal limits - see flow record. Patient eating and drinking normally. Patient able to empty bladder independently and is up ambulating. No apparent signs of infection. Perineum healing well. Patient performing self cares and is able to care for infant.  Action: Patient medicated during the shift for pain. See MAR. Patient reassessed within 1 hour after each medication and pain was improved - patient stated she was comfortable. Patient education done, see flow record. Received IV Venofer. Arm above IV site irritated per patient. No signs of infiltration. New IV site obtained and no problems with infusion.  Response: Positive attachment behaviors observed with infant. Support person (FOB) present.   Plan: Anticipate discharge this afternoon.

## 2018-08-16 NOTE — DISCHARGE SUMMARY
Post Partum Discharge  Note    Lucia Lora  MRN# [id number]    Age: 38 year old  YOB: 1980      Date of Admission:  2018   Date of Discharge::  2018  Admitting Provider:  TORITO Garrison CNM  Discharging Provider:  TORITO Neves, RYDER    Home clinic: [facility]    SIGNIFICANT PROBLEMS:  Patient Active Problem List   Diagnosis     Thyromegaly     Insulin controlled gestational diabetes mellitus (GDM) during pregnancy, antepartum     Gestational diabetes     LGA (large for gestational age) fetus affecting management of mother     Supervision of high risk pregnancy in third trimester - [facility] pt. Please call [phone] if questions     Amniotic fluid leaking     Benign gestational thrombocytopenia in third trimester (H)     Encounter for triage in pregnant patient     Labor and delivery, indication for care      (normal spontaneous vaginal delivery)      ADMISSION DIAGNOSIS:  Labor and Delivery   DISCHARGE DIAGNOSIS:     HOSPITAL COURSE:  39w0d  Lucia Lora is a 38 year old female     Brief Labor Course:  present for labor and birth. pt arrived in active labor with intact BOW. Had been scheduled for IOL today for oligo and insulin dependent GDM, poorly controlled. Progressed to 9cm with strong urge to push and had SROM thick meconium fluid. NICU and Dr Garcia called for delivery. Pushed x2 to .  Delivery Note:   Tight perineum and anterior circumcision not stretching with strong maternal pushing effort. Small posterior and small anterior episiotomy cut with pt consent.  viable male and placed on moms abd. Stimulated to cry with good tone. Brought to warmer for evaluation by NICU for weak cry. spont murillo placenta delivered intact with IV pitocin opened after delivery of baby. Moderate free flow and small clots after placenta resolved with fundal massage, oral misoprostol and IM Methergine. Dr garcia inspected perineum  and repaired small anterior episiotomy with 4-0 vicryl over 1% lidocaine. ML episiotomy repaired with 3-0 vicryl over 1% lidocaine. Pt used nitrous for repair with good relief. Mom and infant stable.  IUP at 39 weeks gestation delivered on August 15, 2018.     delivery of a viable Male infant.  Weight : 9 pounds 5 ounces   Apgars of 7 at 1 minute and 9 at 5 minutes.  Labor was spontaneous.  Medications administered  in labor:  Pain Rx Fentanyl and Nitrous Oxide; Antibiotics No; Other   Perineum: Midline Episiotomy, Anterior Episiotomy  Placenta-mechanism: spontaneous, intact,  with a 3 vessel cord. IV oxytocin given after delivery of the baby, and IM Methergine, and oral misoprostol were given after delivery of placenta.  Quantitative Blood Loss was pending. EBL 800cc.  Complications of labor and delivery: Gestational Diabetes -insulin dependent, poorly controlled, Macrosomia, Meconium stained fluid, Oligohydramnios and Postpartum Hemorrhage  Anticipated Discharge Date: 18  Birth attendants: Ann Crabtree CNM and MD Ann Calvin, APRN CNM    INTERVAL HISTORY:  BP (!) 139/92  Pulse 93  Temp 97.9  F (36.6  C) (Oral)  Resp 18  LMP 11/15/2017  Breastfeeding? Unknown   Pt stable, baby is rooming in. Baby girl, doing well. 9lb 14 oz.   Breast feeding status:initiated and well established; occasionally feeding small amt of formula per pt preference  Complications since 2 hours post delivery: PPH; Initial BG after delivery 135, 132.  Patient is tolerating activity well, Voiding without difficulty, cramping is minimal and is relieved by Ibuprofen, lochia is decreasing and patient denies clots.  Perineal pain is is minimal and is relieved by Ibuprofen.  The perineum laceration is well approximated    Postpartum hemoglobin     Recent Labs  Lab 18  0705 08/15/18  0816 18  2330 18  1605   HGB 8.3* 9.4* 11.1* 10.3*      Blood type   Lab Results   Component Value  Date    ABO A 08/14/2018    RH Neg 08/14/2018     Baby RH negative- does not need Rhogam    Rubella status  Immune    History of depression: Denies. Postpartum depression warning signs reviewed.    ASSESSMENT:    Breasts: soft, filling  Nipples: intact without lesions, nontender  Fundus: firm @ umbilicus, midline, nttp  Abdomen: soft, +bs, nttp  Lochia: small rubra, no clots  Perineum: well approximated, healing well, no erythema, edema, ruq/epigastric pain, hematoma or s/s of infection  Legs: trace edema, nontender    # Discharge Pain Plan:  - During her hospitalization, Lucia experienced pain due to perineal soreness.  The pain plan for discharge was discussed with Lucia and the plan was created in a collaborative fashion.    - Motrin, tylenol ordered for discharge;reviewed non-pharmacologic comfort measures     PLAN:  Normal postpartum exam , Stable Post-partum day #1  Complications: PPH- anemic- IV venofer x 1 inpatient, PO iron supp on discharge; GDM -insulin- needs 6 week 2 hour glucose test; thrombocytopenia- repeat cbc with  plts at 6 weeks  Home Visit Ordered- Yes    Postpartum warning s/s reviewed, including bleeding/clots, fever, mastitis, or depression, Kegels/ crunches.    Birthcontrol planned:Undecided. Would like to discuss at 6 weeks PP.      Discharge medications ordered- Motrin, tylenol, stool softener.  Reviewed hemoglobin of 8.4. Per protocol, recommended dose of IV venofer inpatient prior to discharge. Reviewed r/b/a. Pt agrees with plan of care.  Also recommended PO iron supplementation. Prescription sent for po iron, vitamin c and b12. Reviewed use.  Reviewed  increasing iron rich foods and continuing prenatal vitamin.       Consulted with Dr. Cantor re: patient's postpartum blood sugars. Recommends no further testing until 6 weeks postpartum. Also discussed with Diabetes CNS who agreed. Reviewed with patient recommendation for 2 hour glucose test at 6 weeks.    Follow up on thrombocytopenia  at 6 weeks with CBC with plts.    Plan d/c home today.     Return to [facility] in 2 weeks for pp check if desired and then 6 weeks for routine PP visit.        Review of your medicines      START taking       Dose / Directions    acetaminophen 325 MG tablet   Commonly known as:  TYLENOL        Dose:  650 mg   Take 2 tablets (650 mg) by mouth every 4 hours as needed   Quantity:  30 tablet   Refills:  0       ascorbic acid 250 MG tablet   Commonly known as:  VITAMIN C   Used for:  Anemia, unspecified type        Dose:  250 mg   Take 1 tablet (250 mg) by mouth daily (with breakfast)   Quantity:  90 tablet   Refills:  0       B-12 1000 MCG Tabs   Used for:  Anemia, unspecified type        Dose:  1 tablet   Take 1 tablet by mouth daily   Quantity:  90 tablet   Refills:  0       ferrous sulfate 325 (65 Fe) MG tablet   Commonly known as:  IRON   Used for:  Anemia, unspecified type        Dose:  325 mg   Take 1 tablet (325 mg) by mouth daily (with breakfast)   Quantity:  90 tablet   Refills:  0       ibuprofen 800 MG tablet   Commonly known as:  ADVIL/MOTRIN        Dose:  800 mg   Take 1 tablet (800 mg) by mouth every 6 hours as needed for other (cramping)   Quantity:  30 tablet   Refills:  0       senna-docusate 8.6-50 MG per tablet   Commonly known as:  SENOKOT-S;PERICOLACE        Dose:  1 tablet   Take 1 tablet by mouth 2 times daily   Quantity:  30 tablet   Refills:  0         CONTINUE these medicines which have NOT CHANGED       Dose / Directions    prenatal multivitamin plus iron 27-0.8 MG Tabs per tablet        Dose:  1 tablet   Take 1 tablet by mouth   Refills:  0         STOP taking          insulin  UNIT/ML injection   Commonly known as:  HumuLIN N/NovoLIN N                Where to get your medicines      These medications were sent to [facility] [place], [place] - [address]  [address] [address]     Phone:  [phone]      acetaminophen 325 MG tablet      ascorbic acid 250 MG tablet     B-12 1000 MCG Tabs     ferrous sulfate 325 (65 Fe) MG tablet     ibuprofen 800 MG tablet     senna-docusate 8.6-50 MG per tablet                   Discharge Instructions and Follow-Up:   Discharge diet: Regular   Discharge activity: Pelvic rest: abstain from intercourse and do not use tampons for 6 week(s)   Discharge follow-up: RTC in 2 weeks for check; 6 weeks for routine PP appointment   Perineal care: Sitz baths, peribottle, witch hazel, ice packs prn           Discharge Disposition:   Discharged to home      TORITO Neves, RYDER

## 2018-08-16 NOTE — PROGRESS NOTES
Late entry:  [facility]: [facility]   Reason for maternal/fetal assessment per patient is Rule Out Labor  .  Patient is a . Prenatal record reviewed.      Obstetric History       T4      L4     SAB0   TAB0   Ectopic0   Multiple0   Live Births4       # Outcome Date GA Lbr Gokul/2nd Weight Sex Delivery Anes PTL Lv   4 Term 08/15/18 39w0d 07:11 / 00:18 4.22 kg (9 lb 4.9 oz) M Vag-Spont Nitrous,IV [facility] N [facility]ATTY      Name: AARON CHATMAN      Apgar1:  7                Apgar5: 9   3 Term         MATTY   2 Term         MATTY   1 Term         MATTY       Medical history: GDM on insulin. Gestational Age 39w0d. VSS. Fetal movement present. Patient denies  backache, vaginal discharge, pelvic pressure, UTI symptoms, GI problems, bloody show, vaginal bleeding, edema, headache, visual disturbances, epigastric or URQ pain, or rupture of membranes. Per patient she started having stonger contractions this evening. Support person Zach present.  Action: Verbal consent for EFM. Triage assessment completed. EFM applied for NST: Reactive. Uterine assessment shows uc's on toco q2-4 minutes, palpating as moderate. Fetal assessment: Presumed adequate fetal oxygenation documented (see flow record).   Response: Noe Crabtree CNM informed of above information, and CNM performed U/S: Cephalic, SVE 70/-3 on arrival. CNM re-checked SVE after patient reporting stronger uc's: 80/-2. Plan per provider is to admit patient. Patient verbalized agreement with plan. Patient in room 481, oriented to room and call light.

## 2018-08-16 NOTE — PLAN OF CARE
Problem: Patient Care Overview  Goal: Plan of Care/Patient Progress Review  Outcome: Adequate for Discharge Date Met: 08/16/18  VSS. Postpartum assessment WNL. Pt declines pain, managed well with tylenol and ibuprofen. Pt declines breast pump for home, told patient to contact provider if she would change her mind after going home. Pt did report some shoulder/neck pain that self resolved. Pt stated she thought it was from how she slept last night. Pt tolerated IV iron. Reviewed AVS. Pt declined , having visitor interpret as needed. Reviewed discharge medications. Pt to discharge home with spouse and baby.

## 2018-08-16 NOTE — PLAN OF CARE
Problem: Patient Care Overview  Goal: Plan of Care/Patient Progress Review  Outcome: No Change  VSS. Postpartum check WDL. Taking Ibuprofen and Tylenol cramping for pain; helpful per pt report. BG prior to dinner and at bedtime did not require insulin coverage. Has been drinking her cultural tea throughout the evening. Voiding without difficulty. Breast feeding with minimal assist for deeper latch.  at bedside. Continue cares.

## 2019-04-30 NOTE — PROGRESS NOTES
Patient seen for low HAWK. See flow sheet for fetal and uterine monitoring. VSS, afebrile, no complains. Patient is discharged home. She will be call tomorrow morning  to come to [facility] clinic for  A BPP.    GERD (gastroesophageal reflux disease) GERD (gastroesophageal reflux disease)

## 2019-06-17 ENCOUNTER — HOSPITAL ENCOUNTER (OUTPATIENT)
Dept: ULTRASOUND IMAGING | Facility: CLINIC | Age: 39
Discharge: HOME | End: 2019-06-17
Attending: NURSE PRACTITIONER | Admitting: NURSE PRACTITIONER
Payer: COMMERCIAL

## 2019-06-17 DIAGNOSIS — N91.2 AMENORRHEA: ICD-10-CM

## 2019-06-17 PROCEDURE — 76805 OB US >/= 14 WKS SNGL FETUS: CPT

## 2019-06-21 PROBLEM — O09.299 HISTORY OF GESTATIONAL DIABETES MELLITUS (GDM) IN PRIOR PREGNANCY, CURRENTLY PREGNANT: Chronic | Status: ACTIVE | Noted: 2018-07-02

## 2019-06-21 PROBLEM — O42.90 AMNIOTIC FLUID LEAKING: Status: RESOLVED | Noted: 2018-08-13 | Resolved: 2019-06-21

## 2019-06-21 PROBLEM — O99.113 BENIGN GESTATIONAL THROMBOCYTOPENIA IN THIRD TRIMESTER (H): Status: RESOLVED | Noted: 2018-08-13 | Resolved: 2019-06-21

## 2019-06-21 PROBLEM — O36.60X0 LGA (LARGE FOR GESTATIONAL AGE) FETUS AFFECTING MANAGEMENT OF MOTHER: Status: RESOLVED | Noted: 2018-07-05 | Resolved: 2019-06-21

## 2019-06-21 PROBLEM — O09.299 HISTORY OF GESTATIONAL DIABETES MELLITUS (GDM) IN PRIOR PREGNANCY, CURRENTLY PREGNANT: Status: ACTIVE | Noted: 2018-07-02

## 2019-06-21 PROBLEM — D69.6 BENIGN GESTATIONAL THROMBOCYTOPENIA IN THIRD TRIMESTER (H): Status: RESOLVED | Noted: 2018-08-13 | Resolved: 2019-06-21

## 2019-06-21 PROBLEM — Z86.32 HISTORY OF GESTATIONAL DIABETES MELLITUS (GDM) IN PRIOR PREGNANCY, CURRENTLY PREGNANT: Chronic | Status: ACTIVE | Noted: 2018-07-02

## 2019-06-21 PROBLEM — O24.414 INSULIN CONTROLLED GESTATIONAL DIABETES MELLITUS (GDM) DURING PREGNANCY, ANTEPARTUM: Status: RESOLVED | Noted: 2018-07-02 | Resolved: 2019-06-21

## 2019-06-21 PROBLEM — O09.93 SUPERVISION OF HIGH RISK PREGNANCY IN THIRD TRIMESTER: Status: ACTIVE | Noted: 2018-07-06

## 2019-06-21 PROBLEM — Z86.32 HISTORY OF GESTATIONAL DIABETES MELLITUS (GDM) IN PRIOR PREGNANCY, CURRENTLY PREGNANT: Status: ACTIVE | Noted: 2018-07-02

## 2019-06-21 PROBLEM — Z36.89 ENCOUNTER FOR TRIAGE IN PREGNANT PATIENT: Status: RESOLVED | Noted: 2018-08-14 | Resolved: 2019-06-21

## 2019-06-28 DIAGNOSIS — Z36.89 ENCOUNTER FOR FETAL ANATOMIC SURVEY: Primary | ICD-10-CM

## 2019-07-09 ENCOUNTER — PRE VISIT (OUTPATIENT)
Dept: MATERNAL FETAL MEDICINE | Facility: CLINIC | Age: 39
End: 2019-07-09

## 2019-07-11 ENCOUNTER — OFFICE VISIT (OUTPATIENT)
Dept: MATERNAL FETAL MEDICINE | Facility: CLINIC | Age: 39
End: 2019-07-11
Attending: ADVANCED PRACTICE MIDWIFE
Payer: COMMERCIAL

## 2019-07-11 ENCOUNTER — HOSPITAL ENCOUNTER (OUTPATIENT)
Dept: ULTRASOUND IMAGING | Facility: CLINIC | Age: 39
Discharge: HOME | End: 2019-07-11
Attending: ADVANCED PRACTICE MIDWIFE | Admitting: ADVANCED PRACTICE MIDWIFE
Payer: COMMERCIAL

## 2019-07-11 DIAGNOSIS — O26.90 PREGNANCY RELATED CONDITION, ANTEPARTUM: ICD-10-CM

## 2019-07-11 DIAGNOSIS — O09.522 MULTIGRAVIDA OF ADVANCED MATERNAL AGE IN SECOND TRIMESTER: Primary | ICD-10-CM

## 2019-07-11 DIAGNOSIS — O35.9XX0 SUSPECTED FETAL ANOMALY, ANTEPARTUM, SINGLE OR UNSPECIFIED FETUS: ICD-10-CM

## 2019-07-11 DIAGNOSIS — O09.522 SUPERVISION OF ELDERLY MULTIGRAVIDA IN SECOND TRIMESTER: Primary | ICD-10-CM

## 2019-07-11 PROCEDURE — 40000072 ZZH STATISTIC GENETIC COUNSELING, < 16 MIN: Mod: ZF | Performed by: GENETIC COUNSELOR, MS

## 2019-07-11 PROCEDURE — 76811 OB US DETAILED SNGL FETUS: CPT

## 2019-07-11 NOTE — PROGRESS NOTES
[facility]  Genetic Counseling Consult    Patient:  Lucia Lora YOB: 1980   Date of Service:  19      Lucia Lora was seen at the [facility] for genetic consultation as part of her appointment for comprehensive ultrasound.  The indication for genetic counseling is advanced maternal age.  Today's appointment was facilitated by an in person [place]ian .  Keyonna Linn from "[employer], ID# [id number].        Impression/Plan:   1. Lucia has declined serum screening in this pregnancy.    2. Lucia had a comprehensive (level II) ultrasound today which noted multiple markers of aneuploidy.  Please see the ultrasound report for further details.  Lucia did not meet with genetic counseling after today's scan to discuss testing options in more detail.    3. The patient declines genetic amniocentesis and maternal serum screening today.     4. Lucia has a history of insulin dependent gestational diabetes, and reports that her primary OB provider is in the process of screening her for this during this current pregnancy.     Pregnancy History:   /Parity:    Age at Delivery: 39 year old  ANDREINA: 12/10/2019, by Last Menstrual Period  Gestational Age: 18w2d    No significant complications or exposures were reported in the current pregnancy.    Lucia s pregnancy history is significant for 4 prior full term pregnancies, the most recent of which was complicated by insulin dependent gestational diabetes.    Medical History:   Lucia s reported medical history is not expected to impact pregnancy management or risks to fetal development.       Family History:   A three-generation pedigree was obtained at a previous genetic counseling encounter, and is scanned under the  Media  tab. The family history was reviewed today and Lucia reports no significant updates other than the birth of her  youngest son, who is 10 months old, healthy and developmentally on track.  The reported family history is negative for multiple miscarriages, stillbirths, birth defects, cognitive impairment, known genetic conditions, and consanguinity.       Carrier Screening:       The patient has had previous hemoglobin electrophoresis, the results of which were Negative.  A copy of the report was reviewed in a previous genetic counseling encounter.       Risk Assessment for Chromosome Conditions:   We explained that the risk for fetal chromosome abnormalities increases with maternal age. We discussed specific features of common chromosome abnormalities, including Down syndrome, trisomy 13, trisomy 18, and sex chromosome trisomies.      - At age 39 at midtrimester, the risk to have a baby with Down syndrome is 1 in 168.     - At age 39 at midtrimester, the risk to have a baby with any chromosome abnormality is 1 in 82.       Lucia did not have maternal serum screening earlier in pregnancy.         Testing Options:     Lucia reports that she has declined screening to this point as this information would not change anything about her pregnancy for her.  I reassured her that all testing is optional, and that there are testing options available throughout pregnancy if she decides that this testing may be beneficial for her.  Lucia understands that further testing, including screening tests that are no risk to the pregnancy, are available for the duration of pregnancy.  Lucia declined a discussion of specific testing options available at this time. I encouraged her to contact our clinic with any questions she has regarding testing options if the arise at a later time.      It was a pleasure to be involved with Lucia s care. Face-to-face time of the meeting was 15 minutes.    Harrison Hussein MS, [facility]  Licensed Genetic Counselor  Phone: [phone]  Pager: [phone]

## 2019-07-29 DIAGNOSIS — Z86.32 HISTORY OF GESTATIONAL DIABETES MELLITUS (GDM) IN PRIOR PREGNANCY, CURRENTLY PREGNANT: Primary | ICD-10-CM

## 2019-07-29 DIAGNOSIS — O24.410 DIET CONTROLLED GESTATIONAL DIABETES MELLITUS (GDM), ANTEPARTUM: ICD-10-CM

## 2019-07-29 DIAGNOSIS — O09.299 HISTORY OF GESTATIONAL DIABETES MELLITUS (GDM) IN PRIOR PREGNANCY, CURRENTLY PREGNANT: Primary | ICD-10-CM

## 2019-08-09 ENCOUNTER — DOCUMENTATION ONLY (OUTPATIENT)
Dept: MATERNAL FETAL MEDICINE | Facility: CLINIC | Age: 39
End: 2019-08-09

## 2019-08-09 ENCOUNTER — OFFICE VISIT (OUTPATIENT)
Dept: MATERNAL FETAL MEDICINE | Facility: CLINIC | Age: 39
End: 2019-08-09
Attending: OBSTETRICS & GYNECOLOGY
Payer: COMMERCIAL

## 2019-08-09 ENCOUNTER — HOSPITAL ENCOUNTER (OUTPATIENT)
Dept: ULTRASOUND IMAGING | Facility: CLINIC | Age: 39
Discharge: HOME | End: 2019-08-09
Attending: OBSTETRICS & GYNECOLOGY | Admitting: OBSTETRICS & GYNECOLOGY
Payer: COMMERCIAL

## 2019-08-09 DIAGNOSIS — O35.9XX0 SUSPECTED FETAL ANOMALY, ANTEPARTUM, SINGLE OR UNSPECIFIED FETUS: ICD-10-CM

## 2019-08-09 DIAGNOSIS — O35.BXX0 ANOMALY OF HEART OF FETUS AFFECTING PREGNANCY, ANTEPARTUM, SINGLE OR UNSPECIFIED FETUS: Primary | ICD-10-CM

## 2019-08-09 DIAGNOSIS — E66.01 MORBID OBESITY (H): ICD-10-CM

## 2019-08-09 DIAGNOSIS — O24.410 DIET CONTROLLED GESTATIONAL DIABETES MELLITUS (GDM) IN SECOND TRIMESTER: ICD-10-CM

## 2019-08-09 PROCEDURE — 76816 OB US FOLLOW-UP PER FETUS: CPT

## 2019-08-09 NOTE — PROGRESS NOTES
Lucia was seen in [facility] today for a follow-up comprehensive ultrasound. Due to Lucia's advanced maternal age and multiple markers identified on ultrasound, Dr. Jessica Foley highly suspects Down syndrome in this pregnancy. Lucia has declined non-invasive genetic screening and diagnostic testing. She did not wish to meet with a genetic counselor today, however, Down syndrome resources in her Chilean language were made available to her from the Down Syndrome Association. Genetic counseling will remain as a resource for her throughout this pregnancy if she desires further discussion.     Moon Rodriguez MS, [facility]  Maternal Fetal Medicine  [facility]  Ph: [phone]  [email or web address]

## 2019-08-09 NOTE — NURSING NOTE
Alverto  presents for today's appointment.    Linda Babin    ID#:  [id number]    Agency:  HANH Metzger RN

## 2019-08-29 ENCOUNTER — CARE COORDINATION (OUTPATIENT)
Dept: MATERNAL FETAL MEDICINE | Facility: CLINIC | Age: 39
End: 2019-08-29

## 2019-09-03 ENCOUNTER — TELEPHONE (OUTPATIENT)
Dept: MATERNAL FETAL MEDICINE | Facility: CLINIC | Age: 39
End: 2019-09-03

## 2019-09-11 ENCOUNTER — TRANSCRIBE ORDERS (OUTPATIENT)
Dept: MATERNAL FETAL MEDICINE | Facility: CLINIC | Age: 39
End: 2019-09-11

## 2019-09-11 DIAGNOSIS — O24.410 DIET CONTROLLED GESTATIONAL DIABETES MELLITUS (GDM), ANTEPARTUM: ICD-10-CM

## 2019-09-11 DIAGNOSIS — O36.62X0 EXCESSIVE FETAL GROWTH AFFECTING MANAGEMENT OF PREGNANCY IN SECOND TRIMESTER, SINGLE OR UNSPECIFIED FETUS: ICD-10-CM

## 2019-09-11 DIAGNOSIS — O09.93 SUPERVISION OF HIGH RISK PREGNANCY IN THIRD TRIMESTER: Primary | ICD-10-CM

## 2019-09-11 DIAGNOSIS — O26.90 PREGNANCY RELATED CONDITION, ANTEPARTUM: Primary | ICD-10-CM

## 2019-09-17 ENCOUNTER — TELEPHONE (OUTPATIENT)
Dept: OBGYN | Facility: CLINIC | Age: 39
End: 2019-09-17

## 2019-09-17 NOTE — TELEPHONE ENCOUNTER
----- Message from TORITO Hoff CNM sent at 9/11/2019  9:53 AM CDT -----  [facility] pt we are MELANIA to ESHA MONTILLA needing insulin noncompliant with bringing BS log for  Review reports high am FBS  Has needed insulin in past preg.  Evon KELLY from [facility] will be connecting to help coordinate and notify pt .  ENDO referral placed MFM for growth US    NEEDS ESHA MELANIA MD appt she is 27 wks

## 2019-09-18 ENCOUNTER — OFFICE VISIT (OUTPATIENT)
Dept: MATERNAL FETAL MEDICINE | Facility: CLINIC | Age: 39
End: 2019-09-18
Attending: OBSTETRICS & GYNECOLOGY
Payer: MEDICAID

## 2019-09-18 ENCOUNTER — TELEPHONE (OUTPATIENT)
Dept: OBGYN | Facility: CLINIC | Age: 39
End: 2019-09-18

## 2019-09-18 ENCOUNTER — HOSPITAL ENCOUNTER (OUTPATIENT)
Dept: ULTRASOUND IMAGING | Facility: CLINIC | Age: 39
Discharge: HOME | End: 2019-09-18
Attending: OBSTETRICS & GYNECOLOGY | Admitting: OBSTETRICS & GYNECOLOGY
Payer: MEDICAID

## 2019-09-18 DIAGNOSIS — O35.BXX0 ANOMALY OF HEART OF FETUS AFFECTING PREGNANCY, ANTEPARTUM, SINGLE OR UNSPECIFIED FETUS: ICD-10-CM

## 2019-09-18 DIAGNOSIS — O35.10X0 SUSPECTED CHROMOSOME ANOMALY OF FETUS AFFECTING MANAGEMENT OF MOTHER IN SINGLETON PREGNANCY, ANTEPARTUM: Primary | ICD-10-CM

## 2019-09-18 DIAGNOSIS — O24.410 DIET CONTROLLED GESTATIONAL DIABETES MELLITUS (GDM) IN SECOND TRIMESTER: ICD-10-CM

## 2019-09-18 DIAGNOSIS — O24.419 GESTATIONAL DIABETES MELLITUS (GDM), ANTEPARTUM, GESTATIONAL DIABETES METHOD OF CONTROL UNSPECIFIED: ICD-10-CM

## 2019-09-18 PROCEDURE — 76816 OB US FOLLOW-UP PER FETUS: CPT

## 2019-09-18 NOTE — TELEPHONE ENCOUNTER
----- Message from Violeta Gloria RN sent at 9/18/2019 11:07 AM CDT -----  : please change appointment to MD/resident and nurse visit for MELANIA - also needs to see Dr. Bright for management of Gestational Diabetes - try to arrange the same day please.    LETICIA Chen September 18, 2019 11:11 AM      ----- Message -----  From: Ashley Fowler APRN CNM  Sent: 9/11/2019   9:53 AM  To: E[facility] Rn-p [facility]    [facility] needing insulin noncompliant with bringing BS log for  Review reports high am FBS  Has needed insulin in past preg.  Evon RN from [facility] will be connecting to help coordinate and notify pt .  ENDO referral placed MFM for growth US    NEEDS ESHA MELANIA MD appt she is 27 wks

## 2019-09-18 NOTE — TELEPHONE ENCOUNTER
LVM for pt with  to calll back to reschedule with a doctor for transfer of OB care  and also internal medicine Dr Bright for gestional diabetes management

## 2019-10-07 ENCOUNTER — TELEPHONE (OUTPATIENT)
Dept: OBGYN | Facility: CLINIC | Age: 39
End: 2019-10-07

## 2019-10-07 NOTE — TELEPHONE ENCOUNTER
Lucia has cancelled or been no show for several appointments.   Per CareEverywhere, has upcoming appointments in [facility] system.  No further action at this time.

## 2019-10-28 ENCOUNTER — TELEPHONE (OUTPATIENT)
Dept: MATERNAL FETAL MEDICINE | Facility: CLINIC | Age: 39
End: 2019-10-28

## 2019-10-28 NOTE — TELEPHONE ENCOUNTER
Patient has No showed x2 with MFM. M saw her last on 10/9. Is patient still being seen a [facility] or she seeing [facility]? Does M need to continue to reach out to patient to schedule Follow Up Comprehensive ultrasound?     Dee Higuera    [phone]

## 2025-07-28 ENCOUNTER — OFFICE VISIT (OUTPATIENT)
Dept: URGENT CARE | Facility: URGENT CARE | Age: 45
End: 2025-07-28
Payer: COMMERCIAL

## 2025-07-28 VITALS
OXYGEN SATURATION: 98 % | TEMPERATURE: 99.5 F | HEIGHT: 69 IN | SYSTOLIC BLOOD PRESSURE: 138 MMHG | HEART RATE: 116 BPM | DIASTOLIC BLOOD PRESSURE: 90 MMHG | BODY MASS INDEX: 41.92 KG/M2 | WEIGHT: 283 LBS

## 2025-07-28 DIAGNOSIS — J30.89 SEASONAL ALLERGIC RHINITIS DUE TO OTHER ALLERGIC TRIGGER: ICD-10-CM

## 2025-07-28 DIAGNOSIS — H10.13 ALLERGIC CONJUNCTIVITIS, BILATERAL: Primary | ICD-10-CM

## 2025-07-28 PROCEDURE — 3074F SYST BP LT 130 MM HG: CPT | Performed by: PHYSICIAN ASSISTANT

## 2025-07-28 PROCEDURE — 99203 OFFICE O/P NEW LOW 30 MIN: CPT | Performed by: PHYSICIAN ASSISTANT

## 2025-07-28 PROCEDURE — 3078F DIAST BP <80 MM HG: CPT | Performed by: PHYSICIAN ASSISTANT

## 2025-07-28 RX ORDER — KETOTIFEN FUMARATE 0.35 MG/ML
1 SOLUTION/ DROPS OPHTHALMIC 2 TIMES DAILY
Qty: 5 ML | Refills: 0 | Status: SHIPPED | OUTPATIENT
Start: 2025-07-28 | End: 2025-08-04

## 2025-07-28 RX ORDER — FLUTICASONE PROPIONATE 50 MCG
1 SPRAY, SUSPENSION (ML) NASAL DAILY
Qty: 16 G | Refills: 0 | Status: SHIPPED | OUTPATIENT
Start: 2025-07-28 | End: 2025-08-27

## 2025-07-28 RX ORDER — CETIRIZINE HYDROCHLORIDE 10 MG/1
10 TABLET ORAL DAILY
Qty: 30 TABLET | Refills: 0 | Status: SHIPPED | OUTPATIENT
Start: 2025-07-28 | End: 2025-08-27

## 2025-07-28 NOTE — PATIENT INSTRUCTIONS
Patient was educated on the natural course of condition  Conservative measures include nasal glucocorticoids sprays and nasal saline irrigation (1-2 times daily). Try to identify potential allergens and do your best to avoid them. See your primary care provider if symptoms worsen or do not improve in 7 days. Seek emergency care if you develop difficulty swallowing or difficulty breathing. .

## 2025-07-28 NOTE — PROGRESS NOTES
Urgent Care Clinic Visit    Chief Complaint   Patient presents with    Eye Problem     Pt states that she has eye itching, white spots on tonsil, sneezing a lot, running  nose , conjunction.OTC med nasal spray.                  7/28/2025     5:08 PM   Additional Questions   Roomed by richard   Accompanied by

## 2025-07-28 NOTE — PROGRESS NOTES
URGENT CARE VISIT:    SUBJECTIVE:   Lucia Lora is a 45 year old female presenting with a chief complaint of stuffy nose, sore throat, sneezing, and eye itching.  Onset was 1 day(s) ago.   She denies the following symptoms: cough - productive and shortness of breath  Course of illness is same.    Treatment measures tried include Afrin with no relief of symptoms.  Predisposing factors include None.    PMH: No past medical history on file.  Allergies: Patient has no known allergies.   Medications:   Current Outpatient Medications   Medication Sig Dispense Refill    acetaminophen (TYLENOL) 325 MG tablet Take 2 tablets (650 mg) by mouth every 4 hours as needed 30 tablet 0    cetirizine (ZYRTEC) 10 MG tablet Take 1 tablet (10 mg) by mouth daily. 30 tablet 0    fluticasone (FLONASE) 50 MCG/ACT nasal spray Spray 1 spray into both nostrils daily. 16 g 0    ketotifen fumarate 0.035% 0.035 % SOLN ophthalmic solution Place 1 drop into both eyes 2 times daily for 7 days. 5 mL 0    ascorbic acid (VITAMIN C) 250 MG tablet Take 1 tablet (250 mg) by mouth daily (with breakfast) (Patient not taking: Reported on 7/28/2025) 90 tablet 0    Cyanocobalamin (B-12) 1000 MCG TABS Take 1 tablet by mouth daily 90 tablet 0    ferrous sulfate (IRON) 325 (65 Fe) MG tablet Take 1 tablet (325 mg) by mouth daily (with breakfast) (Patient not taking: Reported on 7/28/2025) 90 tablet 0    ibuprofen (ADVIL/MOTRIN) 800 MG tablet Take 1 tablet (800 mg) by mouth every 6 hours as needed for other (cramping) (Patient not taking: Reported on 7/28/2025) 30 tablet 0    Prenatal Vit-Fe Fumarate-FA (PRENATAL MULTIVITAMIN PLUS IRON) 27-0.8 MG TABS per tablet Take 1 tablet by mouth (Patient not taking: Reported on 7/28/2025)      senna-docusate (SENOKOT-S;PERICOLACE) 8.6-50 MG per tablet Take 1 tablet by mouth 2 times daily (Patient not taking: Reported on 7/28/2025) 30 tablet 0     Social History:   Social History     Tobacco Use    Smoking status: Never     "Smokeless tobacco: Never   Substance Use Topics    Alcohol use: No       ROS:  Review of systems negative except as stated above.    OBJECTIVE:  BP (!) 138/90 (BP Location: Right arm, Patient Position: Sitting, Cuff Size: Adult Large)   Pulse 116   Temp 99.5  F (37.5  C) (Tympanic)   Ht 1.74 m (5' 8.5\")   Wt 128.4 kg (283 lb)   LMP 07/20/2025 (Approximate)   SpO2 98%   BMI 42.40 kg/m    GENERAL APPEARANCE: healthy, alert and no distress  EYES: EOMI,  PERRL, conjunctiva clear  HENT: ear canals and TM's normal.  Nose and mouth without ulcers, erythema or lesions  NECK: supple, nontender, no lymphadenopathy  RESP: lungs clear to auscultation - no rales, rhonchi or wheezes  CV: regular rates and rhythm, normal S1 S2, no murmur noted  SKIN: no suspicious lesions or rashes      ASSESSMENT:    ICD-10-CM    1. Allergic conjunctivitis, bilateral  H10.13 ketotifen fumarate 0.035% 0.035 % SOLN ophthalmic solution      2. Seasonal allergic rhinitis due to other allergic trigger  J30.89 cetirizine (ZYRTEC) 10 MG tablet     fluticasone (FLONASE) 50 MCG/ACT nasal spray          PLAN:  Patient Instructions   Patient was educated on the natural course of condition  Conservative measures include nasal glucocorticoids sprays and nasal saline irrigation (1-2 times daily). Try to identify potential allergens and do your best to avoid them. See your primary care provider if symptoms worsen or do not improve in 7 days. Seek emergency care if you develop difficulty swallowing or difficulty breathing. .    Patient verbalized understanding and is agreeable to plan. The patient was discharged ambulatory and in stable condition.    Nicole Arnold PA-C ....................  7/28/2025   5:28 PM     "

## 2025-08-22 DIAGNOSIS — J30.89 SEASONAL ALLERGIC RHINITIS DUE TO OTHER ALLERGIC TRIGGER: ICD-10-CM

## 2025-08-25 RX ORDER — CETIRIZINE HYDROCHLORIDE 10 MG/1
10 TABLET ORAL DAILY
Qty: 30 TABLET | Refills: 0 | Status: SHIPPED | OUTPATIENT
Start: 2025-08-25